# Patient Record
Sex: FEMALE | Race: WHITE | NOT HISPANIC OR LATINO | URBAN - METROPOLITAN AREA
[De-identification: names, ages, dates, MRNs, and addresses within clinical notes are randomized per-mention and may not be internally consistent; named-entity substitution may affect disease eponyms.]

---
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
## 2020-11-09 NOTE — ED ADULT NURSE NOTE - CAS EDN INTEG ASSESS
Received request via: Pharmacy    Was the patient seen in the last year in this department? Yes 10/8/20    Does the patient have an active prescription (recently filled or refills available) for medication(s) requested? No  
WDL

## 2022-05-02 ENCOUNTER — EMERGENCY (EMERGENCY)
Facility: HOSPITAL | Age: 22
LOS: 1 days | Discharge: ANOTHER HOSPITAL | End: 2022-05-02
Attending: EMERGENCY MEDICINE | Admitting: EMERGENCY MEDICINE
Payer: COMMERCIAL

## 2022-05-02 VITALS
TEMPERATURE: 98 F | RESPIRATION RATE: 18 BRPM | HEIGHT: 65 IN | OXYGEN SATURATION: 94 % | WEIGHT: 179.9 LBS | DIASTOLIC BLOOD PRESSURE: 83 MMHG | SYSTOLIC BLOOD PRESSURE: 129 MMHG | HEART RATE: 97 BPM

## 2022-05-02 DIAGNOSIS — Z90.89 ACQUIRED ABSENCE OF OTHER ORGANS: Chronic | ICD-10-CM

## 2022-05-02 DIAGNOSIS — F33.9 MAJOR DEPRESSIVE DISORDER, RECURRENT, UNSPECIFIED: ICD-10-CM

## 2022-05-02 PROBLEM — F90.9 ATTENTION-DEFICIT HYPERACTIVITY DISORDER, UNSPECIFIED TYPE: Chronic | Status: ACTIVE | Noted: 2019-08-01

## 2022-05-02 LAB
ALBUMIN SERPL ELPH-MCNC: 3.7 G/DL — SIGNIFICANT CHANGE UP (ref 3.3–5)
ALP SERPL-CCNC: 67 U/L — SIGNIFICANT CHANGE UP (ref 40–120)
ALT FLD-CCNC: 26 U/L — SIGNIFICANT CHANGE UP (ref 12–78)
AMPHET UR-MCNC: NEGATIVE — SIGNIFICANT CHANGE UP
ANION GAP SERPL CALC-SCNC: 5 MMOL/L — SIGNIFICANT CHANGE UP (ref 5–17)
APAP SERPL-MCNC: 3 UG/ML — LOW (ref 10–30)
APAP SERPL-MCNC: 3 UG/ML — LOW (ref 10–30)
AST SERPL-CCNC: 14 U/L — LOW (ref 15–37)
BARBITURATES UR SCN-MCNC: NEGATIVE — SIGNIFICANT CHANGE UP
BASOPHILS # BLD AUTO: 0.05 K/UL — SIGNIFICANT CHANGE UP (ref 0–0.2)
BASOPHILS NFR BLD AUTO: 0.6 % — SIGNIFICANT CHANGE UP (ref 0–2)
BENZODIAZ UR-MCNC: NEGATIVE — SIGNIFICANT CHANGE UP
BILIRUB SERPL-MCNC: 0.2 MG/DL — SIGNIFICANT CHANGE UP (ref 0.2–1.2)
BUN SERPL-MCNC: 13 MG/DL — SIGNIFICANT CHANGE UP (ref 7–23)
CALCIUM SERPL-MCNC: 9.1 MG/DL — SIGNIFICANT CHANGE UP (ref 8.5–10.1)
CHLORIDE SERPL-SCNC: 108 MMOL/L — SIGNIFICANT CHANGE UP (ref 96–108)
CO2 SERPL-SCNC: 26 MMOL/L — SIGNIFICANT CHANGE UP (ref 22–31)
COCAINE METAB.OTHER UR-MCNC: NEGATIVE — SIGNIFICANT CHANGE UP
CREAT SERPL-MCNC: 0.59 MG/DL — SIGNIFICANT CHANGE UP (ref 0.5–1.3)
EGFR: 131 ML/MIN/1.73M2 — SIGNIFICANT CHANGE UP
EOSINOPHIL # BLD AUTO: 0.24 K/UL — SIGNIFICANT CHANGE UP (ref 0–0.5)
EOSINOPHIL NFR BLD AUTO: 3 % — SIGNIFICANT CHANGE UP (ref 0–6)
GLUCOSE SERPL-MCNC: 109 MG/DL — HIGH (ref 70–99)
HCT VFR BLD CALC: 38.2 % — SIGNIFICANT CHANGE UP (ref 34.5–45)
HGB BLD-MCNC: 12.8 G/DL — SIGNIFICANT CHANGE UP (ref 11.5–15.5)
IMM GRANULOCYTES NFR BLD AUTO: 0.4 % — SIGNIFICANT CHANGE UP (ref 0–1.5)
LYMPHOCYTES # BLD AUTO: 1.6 K/UL — SIGNIFICANT CHANGE UP (ref 1–3.3)
LYMPHOCYTES # BLD AUTO: 20 % — SIGNIFICANT CHANGE UP (ref 13–44)
MCHC RBC-ENTMCNC: 31.4 PG — SIGNIFICANT CHANGE UP (ref 27–34)
MCHC RBC-ENTMCNC: 33.5 GM/DL — SIGNIFICANT CHANGE UP (ref 32–36)
MCV RBC AUTO: 93.6 FL — SIGNIFICANT CHANGE UP (ref 80–100)
METHADONE UR-MCNC: NEGATIVE — SIGNIFICANT CHANGE UP
MONOCYTES # BLD AUTO: 0.5 K/UL — SIGNIFICANT CHANGE UP (ref 0–0.9)
MONOCYTES NFR BLD AUTO: 6.3 % — SIGNIFICANT CHANGE UP (ref 2–14)
NEUTROPHILS # BLD AUTO: 5.58 K/UL — SIGNIFICANT CHANGE UP (ref 1.8–7.4)
NEUTROPHILS NFR BLD AUTO: 69.7 % — SIGNIFICANT CHANGE UP (ref 43–77)
NRBC # BLD: 0 /100 WBCS — SIGNIFICANT CHANGE UP (ref 0–0)
OPIATES UR-MCNC: POSITIVE — SIGNIFICANT CHANGE UP
PCP SPEC-MCNC: SIGNIFICANT CHANGE UP
PCP UR-MCNC: NEGATIVE — SIGNIFICANT CHANGE UP
PLATELET # BLD AUTO: 271 K/UL — SIGNIFICANT CHANGE UP (ref 150–400)
POTASSIUM SERPL-MCNC: 4.3 MMOL/L — SIGNIFICANT CHANGE UP (ref 3.5–5.3)
POTASSIUM SERPL-SCNC: 4.3 MMOL/L — SIGNIFICANT CHANGE UP (ref 3.5–5.3)
PROT SERPL-MCNC: 7.3 G/DL — SIGNIFICANT CHANGE UP (ref 6–8.3)
RBC # BLD: 4.08 M/UL — SIGNIFICANT CHANGE UP (ref 3.8–5.2)
RBC # FLD: 13 % — SIGNIFICANT CHANGE UP (ref 10.3–14.5)
SALICYLATES SERPL-MCNC: <1.7 MG/DL — LOW (ref 2.8–20)
SARS-COV-2 RNA SPEC QL NAA+PROBE: SIGNIFICANT CHANGE UP
SODIUM SERPL-SCNC: 139 MMOL/L — SIGNIFICANT CHANGE UP (ref 135–145)
THC UR QL: POSITIVE — SIGNIFICANT CHANGE UP
WBC # BLD: 8 K/UL — SIGNIFICANT CHANGE UP (ref 3.8–10.5)
WBC # FLD AUTO: 8 K/UL — SIGNIFICANT CHANGE UP (ref 3.8–10.5)

## 2022-05-02 PROCEDURE — 99285 EMERGENCY DEPT VISIT HI MDM: CPT

## 2022-05-02 PROCEDURE — 93010 ELECTROCARDIOGRAM REPORT: CPT

## 2022-05-02 PROCEDURE — 90792 PSYCH DIAG EVAL W/MED SRVCS: CPT | Mod: 95

## 2022-05-02 RX ORDER — IBUPROFEN 200 MG
400 TABLET ORAL ONCE
Refills: 0 | Status: COMPLETED | OUTPATIENT
Start: 2022-05-02 | End: 2022-05-02

## 2022-05-02 RX ADMIN — Medication 400 MILLIGRAM(S): at 21:41

## 2022-05-02 RX ADMIN — Medication 400 MILLIGRAM(S): at 20:41

## 2022-05-02 NOTE — ED PROVIDER NOTE - PHYSICAL EXAMINATION

## 2022-05-02 NOTE — ED PROVIDER NOTE - CLINICAL SUMMARY MEDICAL DECISION MAKING FREE TEXT BOX
here with intentional overdose, unclear how much she ingested. no toxidrome noted, will get >4 hour post ingestion Tylenol level and medically clear for psych evaluation.

## 2022-05-02 NOTE — ED BEHAVIORAL HEALTH ASSESSMENT NOTE - OTHER
pending bed availability self and parents not observed Records Checked-No Data: [facility], [facility], [facility], Patient Window, [facility] Outpatient Medical, [facility] Outpatient BH, [facility] ED, [facility] Inpatient, [facility] Outpatient, , [facility] In[facility], [facility] E&A, [facility], [facility], [facility], [facility], [facility], [facility], Social Media (For example - [employer], [employer], [id number]), Forensic Databases.

## 2022-05-02 NOTE — ED BEHAVIORAL HEALTH ASSESSMENT NOTE - VIOLENCE PROTECTIVE FACTORS:
Residential stability Residential stability/Engagement in treatment/Good treatment response/compliance

## 2022-05-02 NOTE — ED BEHAVIORAL HEALTH ASSESSMENT NOTE - DESCRIPTION
graduated [employer] and did some college for 1 semester. as per HPI PRE-HOSPITAL COURSE  SOURCE: Triage documentation  DETAILS: pt presenting as a walk-in accompanied by mother c/o N/V ,Suicidal ideation, Took unknown amount of liquid oxycodone, alcohol and smoked weed    ED COURSE  SOURCE: RN documentation  ARRIVAL: ambulatory / walk-in  BEHAVIOR: Pt is awake, calm, orientedx3, speaking coherently, cooperative with normal MSE. She provided the following hx: States attempted to take extra pain medication in order to hurt herself. States she wants to Die via overdose. States she has been feeling this way for several weeks. States good support system with friends.  States has sought outpt help and has psychiatry involvement.  TREATMENT: Ibuprofen 400 mg given at 20:41 for headache  VISITORS: parents; initially w/ mother then father

## 2022-05-02 NOTE — ED BEHAVIORAL HEALTH ASSESSMENT NOTE - NS ED B[facility] BILLING ATTENDING WO NP TRAINEE
How Severe Are Your Spot(S)?: mild Have Your Spot(S) Been Treated In The Past?: has not been treated Hpi Title: Evaluation of Skin Lesions [id number]

## 2022-05-02 NOTE — ED BEHAVIORAL HEALTH ASSESSMENT NOTE - NSBHROSSTATEMENT_PSY_A_CORE
Called patient to confirm her 9/16 appt with Dr Alfie Harman  Unable to leave message, received a busy tone  Patient has no completed EEG or Lab work  .

## 2022-05-02 NOTE — ED PROVIDER NOTE - NS ED ROS FT
Constitutional: No reported recent fever.  Neurological: No reported acute headache.  Eyes: No reported new vision changes.   Ears, Nose, Mouth, Throat: No reported acute sore throat.  Cardiovascular: No reported current chest pain.  Respiratory: No reported new shortness of breath.  Gastrointestinal: + nausea, vomiting.  Genitourinary: No reported new urinary problems.  Musculoskeletal: No reported acute extremity pain.  Integumentary (skin and/or breast): No reported new rash.

## 2022-05-02 NOTE — ED BEHAVIORAL HEALTH ASSESSMENT NOTE - DETAILS
mother will be contacted by this writer N/V and throat pain w/ Wellbutrin  mg pending bed availability resolved N/V Depression (maternal grandparents); DM and heart disease (father, paternal grandfather & maternal aunt); no family hx of suicides or substance use. resolved somnolence none prior to today headache unable to update parents at this time

## 2022-05-02 NOTE — ED BEHAVIORAL HEALTH ASSESSMENT NOTE - SUMMARY
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 Will), on Zoloft 50 mg, no past psychiatric admissions, suicide attempts or non-suicidal self injury, no history of violence, aggression or legal issues, reports regular use of cannabis and otherwise denies substance use, denies abuse/trauma and reports past medical history of allergic sinusitis and prior tonsillectomy (2/2019) who presents to the ED BIB parents after patient disclosed having ingested alcohol along with Oxycodone-Acetaminophen liquid and smoking cannabis with intent to hurt herself amidst feelings of wanting to die. In the ED, she conveyed feelings of depression for weeks in addition to thoughts of suicide. Her psychiatric evaluation is peculiar in that she does not readily relay an exacerbation of mood or anxiety symptoms precipitating her actions today but rather conveys having recently felt improved in her symptoms in the context of a reduction in situational stressors and initiation of Zoloft by her psychiatrist. She describes impulsive suicidal behavior that is entirely out of character for her and is unable to produce a rational explanation of her motive other than to simply harm herself. While she is no longer expressing active suicidality, the nature of her presentation remains concerning for ongoing risks to self as it is unclear if patient is minimizing her symptoms or presenting with treatment related suicidality as an adverse reaction to a new SSRI medication (particularly higher risk profile with adolescent and young adult patients). It would also be clinically risky to discharge patient on her medication if it is contributing to her safety risks and equally risk to discharge her without the medication if it is in fact alleviating the symptoms that triggered her suicidal behavior today. As such, she meets criteria for inpatient psychiatric admission for a multitude of reasons and is in agreement with the recommendation at this time. Should she become opposed to admission, she does currently meet involuntary criteria but may not if she is able to reliably engage in an account of her motivations in her actions. Nonetheless, her outpatient psychiatric provider would need to be contacted to safely coordinate her disposition if discharge is to be considered.

## 2022-05-02 NOTE — ED ADULT NURSE NOTE - OBJECTIVE STATEMENT
States attempted to take extra pain medication in order to hurt herself.  States she wants to Die via overdose.  States she has been feeling this way for several weeks.  States good support system with friends.  States has sought out pt help and has psychiatry involvement.

## 2022-05-02 NOTE — ED BEHAVIORAL HEALTH ASSESSMENT NOTE - HPI (INCLUDE ILLNESS QUALITY, SEVERITY, DURATION, TIMING, CONTEXT, MODIFYING FACTORS, ASSOCIATED SIGNS AND SYMPTOMS)
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 Will), on Zoloft 50 mg, no past psychiatric admissions, suicide attempts or non-suicidal self injury, no history of violence, aggression or legal issues, reports regular use of cannabis and otherwise denies substance use, denies abuse/trauma and reports past medical history of allergic sinusitis and prior tonsillectomy (2/2019) who presents to the ED BIB parents after patient disclosed having ingested ***. Psychiatry consulted for evaluation.     On assessment, patient relays "I was really depressed when I woke up then I drank medication with alcohol" sometime around 1:30pm. She describes her motives stating "I don't know if I wanted to die but I definitely wanted to hurt myself." She reports taking a couple ounces of some pain reliever from when she had her tonsils removed and drank some alcohol, approximately a shot of rum. She relays "then I got scared and I called my mom" elaborating that "I started not feeling good and I got nervous" and "I was really drowsy and nauseous." After telling her mother, her mother encouraged her to vomit which she did at home prior to leaving and again on ED arrival. She reports feeling better now but just has a bit of a headache and still feels a bit tired. She reports having struggled with depression for years and has been on Zoloft, after first trying a different medication, and the Zoloft seems to be working "I just have good and bad days." She notes having had more good days than bad "but then I night I can't stop thinking." She attributes this to stress from her job, working in retail as an , but notes he resigned a few days before Laura and has been looking for a different job. She tells me that in the interim, she found a psychiatrist, started some new medication, and has been trying to workout more in an effort to improve her mood. She notes that the Zoloft is actually a new medication in the last month. She relays frequently feeling fatigued or tired and always wants to sleep. She describes little to no motivation and anhedonia but does still make efforts to work out as often as she can. She relays robust appetite which she attributes to the Zoloft and her menses. She relays significant struggles with anxiety but feels it has been better controlled in recent months. She conveys intermittent death wishes in addition to SI that was worse around the time she quit her job. She maintains being "not sure if I was trying to kill myself" with her actions today. She admits she was trying to produce some sort of "damage" to herself. She denies HI, AVH, paranoia, or symptoms of tl. She denies any past suicide attempts or non-suicidal self injury. She reports prior nicotine vape use and denies frequent drinking of alcohol. She reports regular use of cannabis, "probably twice a week" usually with friends and otherwise denies any other substance use.     "I think I learned my lesson" elaborating that "this is stupid."     COVID Exposure Screen- Patient  Have you had a COVID-19 test in the last 90 days? Yes, negative at home test ~5 days ago  Have you tested positive for COVID-19 antibodies? Yes, reports having had COVID about 3-4 months ago  Have you received 2 doses of the COVID-19 vaccine? Yes, received 2nd vaccine dose around August  In the past 10 days, have you been around anyone with a positive COVID-19 test? No  Have you been out of [place] within the past 10 days? 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 Will), on Zoloft 50 mg, no past psychiatric admissions, suicide attempts or non-suicidal self injury, no history of violence, aggression or legal issues, reports regular use of cannabis and otherwise denies substance use, denies abuse/trauma and reports past medical history of allergic sinusitis and prior tonsillectomy (2/2019) who presents to the ED BIB parents after patient disclosed having ingested alcohol along with Oxycodone-Acetaminophen liquid and smoking cannabis with intent to hurt herself amidst feelings of wanting to die. In the ED, she conveyed feelings of depression for weeks in addition to thoughts of suicide. Psychiatry consulted for evaluation.     On assessment, patient relays "I was really depressed when I woke up then I drank medication with alcohol" sometime around 1:30pm. She describes her motives stating "I don't know if I wanted to die but I definitely wanted to hurt myself." She reports taking a couple ounces of some pain reliever from when she had her tonsils removed and drank some alcohol, approximately a shot of rum. She relays "then I got scared and I called my mom" elaborating that "I started not feeling good and I got nervous" and "I was really drowsy and nauseous." After telling her mother, her mother encouraged her to vomit which she did at home prior to leaving and again on ED arrival. She reports feeling better now but just has a bit of a headache and still feels a bit tired. She reports having struggled with depression for years and has recently been on Zoloft, after first trying a different medication, and the Zoloft seems to be working "I just have good and bad days." She notes having had more good days than bad recently "but then at night I can't stop thinking." She attributes this to stress from her job, previously working in retail as an , but notes she resigned a few days before Laura and has been looking for a different job. She tells me that in the interim, she found a psychiatrist, started the new medication, and has been trying to workout more in an effort to improve her mood and describes having felt she was actually doing better until this incident today occurred impulsively.    Patient notes that the Zoloft is actually a new medication in the last month after first having tried Wellbutrin with intolerable GI side effects. On ROS, she relays frequently feeling fatigued or tired and always wants to sleep. She describes little to no motivation and anhedonia but does still make efforts to work out as often as she can. She relays robust appetite which she attributes to the Zoloft and her upcoming menses. She relays significant struggles with anxiety but feels it has been better controlled in recent months noting that it was previously triggered mostly by her job. She conveys intermittent death wishes in addition to SI that was worse around the time she quit her job but admits she still intermittently has fleeting thoughts of death or suicide on her "bad days" such as today. She maintains being "not sure if I was trying to kill myself" with her actions today but instead admits she was trying to produce some sort of "damage" to herself. She is unable to convey what the purpose of producing damage would be if not death. She denies HI, AVH, paranoia, or symptoms of tl. She denies any past suicide attempts or non-suicidal self injury. She reports prior nicotine vape use, having quit use about a month ago and denies frequent drinking of alcohol. She reports regular use of cannabis, "probably twice a week" usually with friends and otherwise denies any other substance use. She denies any current thoughts of suicide or imminent intent to end her life stating "I think I learned my lesson" elaborating that "this is stupid."     COVID Exposure Screen- Patient  Have you had a COVID-19 test in the last 90 days? Yes, negative at home test ~5 days ago  Have you tested positive for COVID-19 antibodies? Yes, reports having had COVID about 3-4 months ago  Have you received 2 doses of the COVID-19 vaccine? Yes, received 2nd vaccine dose around August  In the past 10 days, have you been around anyone with a positive COVID-19 test? No  Have you been out of [place] within the past 10 days? No

## 2022-05-02 NOTE — ED BEHAVIORAL HEALTH ASSESSMENT NOTE - RISK ASSESSMENT
Moderate Acute Suicide Risk Pertinent known risk and protective factors are noted in documentation above

## 2022-05-02 NOTE — ED ADULT NURSE REASSESSMENT NOTE - NS ED NURSE REASSESS COMMENT FT1
Received patient from [facility] with 1:1 at the bedside. Received patient from [place] with 1:1 at the bedside and mother informed of the plan of care with understanding. Telepsych vidoe set up at the bedside.

## 2022-05-02 NOTE — ED PROVIDER NOTE - OBJECTIVE STATEMENT
21 F history depression here with mom after suicide attempt. patient with months of worsening depression, made plan to kill herself by drinking 21 F history depression here with mom after suicide attempt. patient with months of worsening depression, made plan to kill herself by drinking alcohol mixed with oxycodone 7.5/tylenol 325. patient had half a bottle left, maybe mixed half of that and drank some. (max 250 ml). patient anabella nausea, dizzy and scared so told her mom. mom made her vomit and she vomited several more times. No prior suicide attempts, takes Zoloft, psychiatrist Dr. Will. patient also smoking marijuana.

## 2022-05-03 ENCOUNTER — INPATIENT (INPATIENT)
Facility: HOSPITAL | Age: 22
LOS: 5 days | Discharge: HOME | End: 2022-05-09
Attending: PSYCHIATRY & NEUROLOGY | Admitting: PSYCHIATRY & NEUROLOGY

## 2022-05-03 VITALS
RESPIRATION RATE: 16 BRPM | TEMPERATURE: 98 F | SYSTOLIC BLOOD PRESSURE: 111 MMHG | HEART RATE: 74 BPM | DIASTOLIC BLOOD PRESSURE: 79 MMHG | OXYGEN SATURATION: 98 %

## 2022-05-03 VITALS — RESPIRATION RATE: 16 BRPM | HEIGHT: 65 IN | TEMPERATURE: 99 F | OXYGEN SATURATION: 97 % | WEIGHT: 179.9 LBS

## 2022-05-03 DIAGNOSIS — F33.9 MAJOR DEPRESSIVE DISORDER, RECURRENT, UNSPECIFIED: ICD-10-CM

## 2022-05-03 DIAGNOSIS — Z90.89 ACQUIRED ABSENCE OF OTHER ORGANS: Chronic | ICD-10-CM

## 2022-05-03 LAB — HCG UR QL: NEGATIVE — SIGNIFICANT CHANGE UP

## 2022-05-03 PROCEDURE — 93005 ELECTROCARDIOGRAM TRACING: CPT

## 2022-05-03 PROCEDURE — 80053 COMPREHEN METABOLIC PANEL: CPT

## 2022-05-03 PROCEDURE — 87635 SARS-COV-2 COVID-19 AMP PRB: CPT

## 2022-05-03 PROCEDURE — 99285 EMERGENCY DEPT VISIT HI MDM: CPT

## 2022-05-03 PROCEDURE — 99222 1ST HOSP IP/OBS MODERATE 55: CPT

## 2022-05-03 PROCEDURE — 36415 COLL VENOUS BLD VENIPUNCTURE: CPT

## 2022-05-03 PROCEDURE — 80307 DRUG TEST PRSMV CHEM ANLYZR: CPT

## 2022-05-03 PROCEDURE — 85025 COMPLETE CBC W/AUTO DIFF WBC: CPT

## 2022-05-03 PROCEDURE — 81025 URINE PREGNANCY TEST: CPT

## 2022-05-03 RX ORDER — METHYLPHENIDATE HCL 5 MG
1 TABLET ORAL
Qty: 0 | Refills: 0 | DISCHARGE

## 2022-05-03 RX ORDER — LANOLIN ALCOHOL/MO/W.PET/CERES
3 CREAM (GRAM) TOPICAL AT BEDTIME
Refills: 0 | Status: DISCONTINUED | OUTPATIENT
Start: 2022-05-03 | End: 2022-05-09

## 2022-05-03 RX ORDER — SERTRALINE 25 MG/1
50 TABLET, FILM COATED ORAL DAILY
Refills: 0 | Status: DISCONTINUED | OUTPATIENT
Start: 2022-05-04 | End: 2022-05-05

## 2022-05-03 RX ADMIN — Medication 3 MILLIGRAM(S): at 21:40

## 2022-05-03 RX ADMIN — Medication 1 TABLET(S): at 20:37

## 2022-05-03 NOTE — BH INPATIENT PSYCHIATRY ASSESSMENT NOTE - HPI (INCLUDE ILLNESS QUALITY, SEVERITY, DURATION, TIMING, CONTEXT, MODIFYING FACTORS, ASSOCIATED SIGNS AND SYMPTOMS)
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 Will), on Zoloft 50 mg, no past psychiatric admissions, suicide attempts or non-suicidal self injury, no history of violence, aggression or legal issues, reports regular use of cannabis and otherwise denies substance use, denies abuse/trauma and reports past medical history of allergic sinusitis and prior tonsillectomy (2/2019) who presents to the ED BIB parents after patient disclosed having ingested alcohol along with Oxycodone-Acetaminophen liquid and smoking cannabis with intent to hurt herself amidst feelings of wanting to die. In the ED, she conveyed feelings of depression for weeks in addition to thoughts of suicide. Patient was transferred to [facility] on 5/3.    On interview, patient is calm, cooperative, intermittently tearful. States she has been struggling with unemployment, with living at home, and with seeing her friends graduate college (patient left college after 1 semester). Patient states she was on [employer] the night before the suicide attempt and saw photos of people graduating. States this made her feel like a "loser." Patient woke up still feeling upset. Her girlfriend was in class and patient did not reach out for support. Patient states she had already seen the bottle of liquid percocet (rxed to her for her tonsil operation) which her mom had hidden in the mom's closet. Patient had noted it in case she would want to hurt herself in the future. On the day of the suicide attempt, patient waited until her father awoke so she could enter the room and get the bottle. She poured some of the medicine into a Angel's cup and got coke from downstairs to mix with it. She drank some but not all of this liquid and then had around a shot of rum from her room because she read not to mix the medication with alcohol. Patient states she thought it was not enough to kill herself, but thought that it would harm her. States that her intent was to harm herself, not kill herself. Patient states it was thinking of her parents and her girlfriend that stopped her from consuming more of the medicine. Patient states she stopped drinking the medicine when she realized she was getting tired, and texted her mother asking her to come up to the patient's room. She had not locked her door, said goodbyes, given away belongings, or imagined how she would be found. Her mother brought patient to the hospital. Reflecting now, patient states the attempt was "really stupid" and states she feels grateful to be alive.     Otherwise as per ED assessment:   "On assessment, patient relays "I was really depressed when I woke up then I drank medication with alcohol" sometime around 1:30pm. She describes her motives stating "I don't know if I wanted to die but I definitely wanted to hurt myself." She reports taking a couple ounces of some pain reliever from when she had her tonsils removed and drank some alcohol, approximately a shot of rum. She relays "then I got scared and I called my mom" elaborating that "I started not feeling good and I got nervous" and "I was really drowsy and nauseous." After telling her mother, her mother encouraged her to vomit which she did at home prior to leaving and again on ED arrival. She reports feeling better now but just has a bit of a headache and still feels a bit tired. She reports having struggled with depression for years and has recently been on Zoloft, after first trying a different medication, and the Zoloft seems to be working "I just have good and bad days." She notes having had more good days than bad recently "but then at night I can't stop thinking." She attributes this to stress from her job, previously working in retail as an , but notes she resigned a few days before [place] and has been looking for a different job. She tells me that in the interim, she found a psychiatrist, started the new medication, and has been trying to workout more in an effort to improve her mood and describes having felt she was actually doing better until this incident today occurred impulsively.    Patient notes that the Zoloft is actually a new medication in the last month after first having tried Wellbutrin with intolerable GI side effects. On ROS, she relays frequently feeling fatigued or tired and always wants to sleep. She describes little to no motivation and anhedonia but does still make efforts to work out as often as she can. She relays robust appetite which she attributes to the Zoloft and her upcoming menses. She relays significant struggles with anxiety but feels it has been better controlled in recent months noting that it was previously triggered mostly by her job. She conveys intermittent death wishes in addition to SI that was worse around the time she quit her job but admits she still intermittently has fleeting thoughts of death or suicide on her "bad days" such as today. She maintains being "not sure if I was trying to kill myself" with her actions today but instead admits she was trying to produce some sort of "damage" to herself. She is unable to convey what the purpose of producing damage would be if not death. She denies HI, AVH, paranoia, or symptoms of tl. She denies any past suicide attempts or non-suicidal self injury. She reports prior nicotine vape use, having quit use about a month ago and denies frequent drinking of alcohol. She reports regular use of cannabis, "probably twice a week" usually with friends and otherwise denies any other substance use. 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 Will), on Zoloft 50 mg, no past psychiatric admissions, suicide attempts or non-suicidal self injury, no history of violence, aggression or legal issues, reports regular use of cannabis and otherwise denies substance use, denies abuse/trauma and reports past medical history of allergic sinusitis and prior tonsillectomy (2/2019) who presents to the ED BIB parents after patient disclosed having ingested alcohol along with Oxycodone-Acetaminophen liquid and smoking cannabis with intent to hurt herself amidst feelings of wanting to die. In the ED, she conveyed feelings of depression for weeks in addition to thoughts of suicide. Patient was transferred to [facility] on 5/3.    On interview, patient is calm, cooperative, intermittently tearful. States she has been struggling with unemployment, with living at home, and with seeing her friends graduate college (patient left college after 1 semester). Patient states she was on [employer] the night before the suicide attempt and saw photos of people graduating. States this made her feel like a "loser." Patient woke up still feeling upset. Her girlfriend was in class and patient did not reach out for support. Patient states she had already seen the bottle of liquid percocet (rxed to her for her tonsil operation) which her mom had hidden in the mom's closet. Patient had noted it in case she would want to hurt herself in the future. On the day of the suicide attempt, patient waited until her father awoke so she could enter the room and get the bottle. She poured some of the medicine into a Angel's cup and got coke from downstairs to mix with it. She drank some but not all of this liquid and then had around a shot of rum from her room because she read not to mix the medication with alcohol. Patient states she thought it was not enough to kill herself, but thought that it would harm her. States that her intent was to harm herself, not kill herself. Patient states it was thinking of her parents and her girlfriend that stopped her from consuming more of the medicine. Patient states she stopped drinking the medicine when she realized she was getting tired, and texted her mother asking her to come up to the patient's room. She had not locked her door, said goodbyes, given away belongings, or imagined how she would be found. Her mother brought patient to the hospital. Reflecting now, patient states the attempt was "really stupid" and states she feels grateful to be alive. Patient denies symptoms of tl and psychosis.    Otherwise as per ED assessment:   "On assessment, patient relays "I was really depressed when I woke up then I drank medication with alcohol" sometime around 1:30pm. She describes her motives stating "I don't know if I wanted to die but I definitely wanted to hurt myself." She reports taking a couple ounces of some pain reliever from when she had her tonsils removed and drank some alcohol, approximately a shot of rum. She relays "then I got scared and I called my mom" elaborating that "I started not feeling good and I got nervous" and "I was really drowsy and nauseous." After telling her mother, her mother encouraged her to vomit which she did at home prior to leaving and again on ED arrival. She reports feeling better now but just has a bit of a headache and still feels a bit tired. She reports having struggled with depression for years and has recently been on Zoloft, after first trying a different medication, and the Zoloft seems to be working "I just have good and bad days." She notes having had more good days than bad recently "but then at night I can't stop thinking." She attributes this to stress from her job, previously working in retail as an , but notes she resigned a few days before [place] and has been looking for a different job. She tells me that in the interim, she found a psychiatrist, started the new medication, and has been trying to workout more in an effort to improve her mood and describes having felt she was actually doing better until this incident today occurred impulsively.    Patient notes that the Zoloft is actually a new medication in the last month after first having tried Wellbutrin with intolerable GI side effects. On ROS, she relays frequently feeling fatigued or tired and always wants to sleep. She describes little to no motivation and anhedonia but does still make efforts to work out as often as she can. She relays robust appetite which she attributes to the Zoloft and her upcoming menses. She relays significant struggles with anxiety but feels it has been better controlled in recent months noting that it was previously triggered mostly by her job. She conveys intermittent death wishes in addition to SI that was worse around the time she quit her job but admits she still intermittently has fleeting thoughts of death or suicide on her "bad days" such as today. She maintains being "not sure if I was trying to kill myself" with her actions today but instead admits she was trying to produce some sort of "damage" to herself. She is unable to convey what the purpose of producing damage would be if not death. She denies HI, AVH, paranoia, or symptoms of tl. She denies any past suicide attempts or non-suicidal self injury. She reports prior nicotine vape use, having quit use about a month ago and denies frequent drinking of alcohol. She reports regular use of cannabis, "probably twice a week" usually with friends and otherwise denies any other substance use. She denies any current thoughts of suicide or imminent intent to end her life stating "I think I learned my lesson" elaborating that "this is stupid.""

## 2022-05-03 NOTE — BH PATIENT PROFILE - STATED REASON FOR ADMISSION
"I got depressed and tried to kill myself." Patient denies current SI intent/thoughts/or plan and reports she will be able to come to staff if she feels the need.

## 2022-05-03 NOTE — BH INPATIENT PSYCHIATRY ASSESSMENT NOTE - OTHER PAST PSYCHIATRIC HISTORY (INCLUDE DETAILS REGARDING ONSET, COURSE OF ILLNESS, INPATIENT/OUTPATIENT TREATMENT)
Patient sees Dr. Will for med management.  Was on ADHD medications 7th grade - college but stopped once she left college  Was on Wellbutrin, partial response to low dose but side effects bad on 300 (nausea)  Patient states she did occasionally have suicidal thoughts while on Wellbutrin  Patient switched to Zoloft 1 month ago, increased to 50mg 2 weeks ago  States Zoloft is working better than Wellbutrin was for depression  History of passive SI (thoughts such as, "I want to die") but no history of active SI, plan or intent  No history of past suicide attempts  No history of NSSIB

## 2022-05-03 NOTE — BH INPATIENT PSYCHIATRY ASSESSMENT NOTE - DESCRIPTION
Patient lives at home with parents. She left college after 1 semester after not attending class (along with peers) and not earning a high enough GPA. Then went to [employer] and worked there, got promoted, for 3 years. Quit in December and has been unemployed since which has been challenging. Has trouble finding motivation to find work. Has a girlfriend in [place] and is thinking about spending summer with gf's family there. Pt's parents are not accepting of pt's girlfriend, who is pt's main source of support. Patient states she has difficulty in her relationships with both parents and does not seek emotional support from them.

## 2022-05-03 NOTE — BH PATIENT PROFILE - FALL HARM RISK - UNIVERSAL INTERVENTIONS
Bed in lowest position, wheels locked, appropriate side rails in place/Call bell, personal items and telephone in reach/Instruct patient to call for assistance before getting out of bed or chair/Non-slip footwear when patient is out of bed/[place] to call system/Physically safe environment - no spills, clutter or unnecessary equipment/Purposeful Proactive Rounding/Room/bathroom lighting operational, light cord in reach

## 2022-05-03 NOTE — BH INPATIENT PSYCHIATRY ASSESSMENT NOTE - MODIFICATIONS
Patient seen by me at length in conference room for initial inpatient interview with resident observing.  I was physically present during the service to the patient and personally examined the patient and I was directly involved in the management plan and recommendations of the care provided to the patient. I have reviewed the admission record and reviewed the patient’s physical examination, review of systems and there are no pertinent positive findings on the review of systems and the admission labs. I have discussed the case with the resident and I have reviewed the resident's progress note. I agree with the progress note’s contents and I have made changes as indicated.

## 2022-05-03 NOTE — ED ADULT NURSE REASSESSMENT NOTE - NS ED NURSE REASSESS COMMENT FT1
Patient had breakfast and tolerated well.  Father at bed side. Patient requesting to take Zoloft and amoxicillin.  Situation discussed with Dr. Blackmon. father gave the medications to patient as per Dr. Blackmon

## 2022-05-03 NOTE — ED ADULT NURSE REASSESSMENT NOTE - DESCRIPTION
Received the patient in the ER at the time of change of shift. Patient is alert and oriented, Resting comfortably in the bed. 1:1 observation is continuing. Pending for bed availability.

## 2022-05-03 NOTE — BH INPATIENT PSYCHIATRY ASSESSMENT NOTE - RISK ASSESSMENT
Acute risk factors include: hopelessness/helplessness, recent suicide attempt, impulsivity, active mood episode, active substance use, acute psychosocial stressors, poor reactivity to stressors, difficulty expressing emotions, social isolation, academic/occupational decline.  Chronic risk factors for suicide include: diagnosis of mood d/o.  Protective factors include: Young, healthy, denies SI/I/P, no history of NSSIB, identifies reasons for living, no prior psychiatric admissions, no active psychosis,  stable housing, engaged in treatment, medication/follow up compliance, help-seeking behaviors, no legal history.

## 2022-05-03 NOTE — BH INPATIENT PSYCHIATRY ASSESSMENT NOTE - DETAILS
Depression in mother but not formally dxed or txed   No history of suicide First meeting Nausea on 300mg buproprion See HPI

## 2022-05-03 NOTE — BH INPATIENT PSYCHIATRY ASSESSMENT NOTE - NSTREATMENTCERTY_PSY_ALL_CORE
This note was copied from the mother's chart.  Patient states infant is nursing well and she has no questions or concerns at this time. Patient states she would like to be discharged after infant is 24 hours old. Discharge information given.   That inpatient services furnished since the previous certification or recertification were, and continue to be required: for treatment that could reasonably be expected to improve the patient's condition; or, for diagnostic study;    That the hospital records show that the services furnished were: intensive treatment services; admission and related services necessary for diagnostic study or equivalent services;    That the patient continues to need, on a daily basis active inpatient treatment furnished directly by or requiring the supervision of inpatient psychiatric facility personnel.

## 2022-05-03 NOTE — ED BEHAVIORAL HEALTH NOTE - BEHAVIORAL HEALTH NOTE
========================  COLLATERAL  ========================  NAME: Nory Amaro   NUMBER: [phone]  RELATIONSHIP: Mother   RELIABILITY: Reliable source    Collateral (Nory Amaro, mother) has requested that the information provided remain confidential: Yes [ x ] No [  ]    Collateral (Nory Amaro, mother) has provided information that patient is/may be unaware of:      Yes [ x ] No [  ]  ========================  HPI:    Collateral obtained from mother who corroborates HPI provided to interdisciplinary team. She relays that at baseline patient is domiciled with her and father (is a retired , firearms have been safeguarded) in a private family home, is unemployed (working hx at [employer] [address], quit in 12/2021), completed one semester of college (not currently enrolled) and is a non-caregiver. She reports a PPHx of depression and anxiety with no hx of psychiatric hospitalizations. She notes that patient currently receives monthly psychopharm follow-up appointments with Dr. Luz Will ([phone], last seen ~3-4 weeks ago) who prescribes her Zoloft 50mg QD; patient is compliant with medication regimen. She relays that to her knowledge and aside from this most recent attempt, patient has hx of referencing SI; however has no hx of SA/SIB/HI. She denies a hx of manic or psychotic symptoms. She denies a hx of ETOH, drug or illicit drug use. She denies a hx of agitation, physical assault, obstruction to property or aggression. She notes that at baseline patients IADLs, appetite, sleeping pattern, energy levels and cognitive abilities/functioning are all WNL.     She notes that for the past few days,  patient has been a little more withdrawn, subdued and sleeping more. She is unable to identify a precipitating trigger, noting that this is significant depressive episode. She notes that yesterday patient sent her a text message admitting to ingesting liquid Oxycodone (left over from a tonsil surgery in 2019), a few Zoloft pills, alcohol and THC. She notes that patient became frightened as she started to feel lethargic and a numb sensation in her lips. She notes that she instructed patient to drink water and shortly thereafter brought her to the ED. She has concerns for self-harm and believes that patient would benefit from additional psychiatric treatment.     Scarlet made an attempt to contact Dr. Will at the number provided with no success; a voicemail was left requesting a phone call back..

## 2022-05-03 NOTE — BH INPATIENT PSYCHIATRY ASSESSMENT NOTE - NSCOMMENTSUICRISKFACT_PSY_ALL_CORE
Patient reports being unable to find a therapist, and feeling distant from friends who are graduating college

## 2022-05-03 NOTE — BH PATIENT PROFILE - HOME MEDICATIONS
methylphenidate 30 mg/24 hours oral capsule, extended release , 1 cap(s) orally once a day (in the morning)

## 2022-05-03 NOTE — BH INPATIENT PSYCHIATRY ASSESSMENT NOTE - CASE SUMMARY
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 Will), on Zoloft 50 mg, no past psychiatric admissions, suicide attempts or non-suicidal self injury, no history of violence, aggression or legal issues, reports regular use of cannabis and otherwise denies substance use, denies abuse/trauma and reports past medical history of allergic sinusitis and prior tonsillectomy (2/2019) who presents to the ED BIB parents after patient disclosed having ingested alcohol along with Oxycodone-Acetaminophen liquid and smoking cannabis with intent to hurt herself amidst feelings of wanting to die      Patient with a few year history of mood disorder consistent with MDD. Has been increasingly depressed over not achieving like her friends who are now graduating college and quit her job 5 months ago in December and partner is out of state  Patient reports seeing her old pain meds in mother’s closet a few days ago. Was in an upsetting chat room and felt helpless and hopeless and with brief SI. Looked up on the internet the lethal dose of the Percocet but took less and texted her mother about what she did. Mom had her throw up and go to ER at [facility] and was transferred su[facility] for inpatient treatment and care    On initial MSE today the patient is casually dressed and makes fair eye contact.  Speech is clear and of normal rate.   Patient’s thought process with no evidence of a disorder of thought process.  Patient’s thought content with no evidence of delusions or obsessions.   Patient denies hallucinations.   Mood "depressed" affect constricted in the depressed range.    Patient admits to passive thoughts of death and her attempt but denies active suicidal ideation, intent and plan.   Patient denies aggressive/homicidal ideation, intent or plan.   Patient is AAO X3. Patient is cognitively grossly intact.   Patient’s insight and judgment are limited.   Patient’s impulse control is intact at this time.        Dx most consistent with MDD  status post suicide attempt with intention  PLAN:  Patient requires acute inpatient care for treatment of depression.  Patient admitted on a 913 voluntary status   Patient does not require constant observation at this time and will follow with routine checks.   Patient’s best past response was on her present dose of sertraline which was increased two weeks ago to her present dose of 50 mg. Prior history of partial response to bupropion but had difficulty tolerating dose at 300 mg    Will obtain collateral from family and psychiatrist and adjust  plan accordingly    Treatment will include individual therapy/supportive therapy/ rehab therapy/ psychopharmacological therapy and milieu therapy

## 2022-05-03 NOTE — BH INPATIENT PSYCHIATRY ASSESSMENT NOTE - CURRENT MEDICATION
MEDICATIONS  (STANDING):    MEDICATIONS  (PRN):   MEDICATIONS  (STANDING):    MEDICATIONS  (PRN):  LORazepam     Tablet 1 milliGRAM(s) Oral every 4 hours PRN anxiety  LORazepam   Injectable 2 milliGRAM(s) IntraMuscular once PRN severe agitation  melatonin. 3 milliGRAM(s) Oral at bedtime PRN Insomnia   MEDICATIONS  (STANDING):  amoxicillin  875 milliGRAM(s)/clavulanate 1 Tablet(s) Oral two times a day    MEDICATIONS  (PRN):  LORazepam     Tablet 1 milliGRAM(s) Oral every 4 hours PRN anxiety  LORazepam   Injectable 2 milliGRAM(s) IntraMuscular once PRN severe agitation  melatonin. 3 milliGRAM(s) Oral at bedtime PRN Insomnia

## 2022-05-03 NOTE — CHART NOTE - NSCHARTNOTEFT_GEN_A_CORE
Screening Medical Evaluation    Patient Admitted from: [facility] ED    [facility] admitting diagnosis: Recurrent major depressive disorder      PAST MEDICAL & SURGICAL HISTORY:  ADHD  History of tonsillectomy    ALLERGIES:  No Known Allergies    SOCIAL HISTORY:  Patient reports hx of vaping nicotine, last use 2 months ago, and occasional cannabis use. Patient denies alcohol or other substance use.     FAMILY HISTORY:  No known pertinent family history in 1st degree relatives.      MEDICATIONS  (STANDING):  amoxicillin  875 milliGRAM(s)/clavulanate 1 Tablet(s) Oral two times a day    MEDICATIONS  (PRN):  LORazepam     Tablet 1 milliGRAM(s) Oral every 4 hours PRN anxiety  LORazepam   Injectable 2 milliGRAM(s) IntraMuscular once PRN severe agitation  melatonin. 3 milliGRAM(s) Oral at bedtime PRN Insomnia      Vital Signs Last 24 Hrs  T(C): 37 (03 May 2022 15:52), Max: 37 (03 May 2022 07:13)  T(F): 98.6 (03 May 2022 15:52), Max: 98.6 (03 May 2022 07:13)  HR: 74 (03 May 2022 14:45) (68 - 82)  BP: 111/79 (03 May 2022 14:45) (104/70 - 131/89)  BP(mean): --  RR: 16 (03 May 2022 15:52) (16 - 17)  SpO2: 97% (03 May 2022 15:52) (97% - 98%)      PHYSICAL EXAM:  GENERAL: NAD, well-developed  HEAD:  Atraumatic, Normocephalic  EYES: EOMI, PERRLA, conjunctiva and sclera clear  NECK: Supple, No JVD  CHEST/LUNG: Clear to auscultation bilaterally; No wheeze  HEART: Regular rate and rhythm; No murmurs, rubs, or gallops  ABDOMEN: Soft, Nontender, Nondistended; Bowel sounds present  EXTREMITIES:  2+ Peripheral Pulses, No clubbing, cyanosis, or edema  PSYCH: AAOx3  NEUROLOGY: non-focal  SKIN: No rashes or lesions      LABS:                        12.8   8.00  )-----------( 271      ( 02 May 2022 15:38 )             38.2     05-02    139  |  108  |  13  ----------------------------<  109<H>  4.3   |  26  |  0.59    Ca    9.1      02 May 2022 15:38    TPro  7.3  /  Alb  3.7  /  TBili  0.2  /  DBili  x   /  AST  14<L>  /  ALT  26  /  AlkPhos  67  05-02      Assessment and Plan:  21F admitted to [facility] for recurrent major depressive disorder w/ PMHx of ADHD seen at bedside for medical screening evaluation. Patient currently on a 2 week course of Augmentin for sinusitis ending on 5/6, currently asymptomatic. Patient has no other acute complaints at this time. Patient denies fever, chills, headache, dizziness, lightheadedness, N/V, SOB, cough, congestion, chest pain, abdominal pain, dysuria, hematuria, diarrhea, constipation. Physical exam unremarkable, VSS. Labs WNL.     1.) Recurrent major depressive disorder: Refer to primary team documentation for recs.  2.) Sinusitis: Continue amoxicillin 875 milliGRAM(s)/clavulanate BID x 3 days (Course ends 5/6)

## 2022-05-03 NOTE — BH INPATIENT PSYCHIATRY ASSESSMENT NOTE - NSBHCHARTREVIEWVS_PSY_A_CORE FT
Vital Signs Last 24 Hrs  T(C): 37 (05-03-22 @ 15:52), Max: 37 (05-03-22 @ 07:13)  T(F): 98.6 (05-03-22 @ 15:52), Max: 98.6 (05-03-22 @ 07:13)  HR: 74 (05-03-22 @ 14:45) (68 - 82)  BP: 111/79 (05-03-22 @ 14:45) (104/70 - 131/89)  BP(mean): --  RR: 16 (05-03-22 @ 15:52) (16 - 17)  SpO2: 97% (05-03-22 @ 15:52) (97% - 98%)    Orthostatic VS  05-03-22 @ 15:52  Lying BP: --/-- HR: --  Sitting BP: 135/85 HR: 113  Standing BP: 115/71 HR: 102  Site: --  Mode: --

## 2022-05-03 NOTE — BH INPATIENT PSYCHIATRY ASSESSMENT NOTE - MSE UNSTRUCTURED FT
Mental Status Exam:  On exam today the patient is calm and cooperative with good eye contact.    Speech is clear and of normal rate.    Thought process: linear and goal directed.    Thought content: with no evidence of false beliefs or obsessions.  Perception: denies perceptual disturbances  Mood: Describes as "depressed"  Affect: Reactive, congruent, depressed, appropriate   SI/HI: Patient denies suicidal ideation, intent or plan. Patient denies active aggressive/homicidal ideation, intent or plan.   Cognition: Patient is Alert and oriented. Cognition grossly intact  Fund of knowledge: Fair  Memory: Recent and remote intact  Insight and judgment: Fair.   Impulse control: Intact at this time.    Vital signs: Stable.

## 2022-05-03 NOTE — BH INPATIENT PSYCHIATRY ASSESSMENT NOTE - NSBHASSESSSUMMFT_PSY_ALL_CORE
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 Will), on Zoloft 50 mg, no past psychiatric admissions, suicide attempts or non-suicidal self injury, no history of violence, aggression or legal issues, reports regular use of cannabis and otherwise denies substance use, denies abuse/trauma and reports past medical history of allergic sinusitis and prior tonsillectomy (2/2019) who presents to the ED BIB parents after patient disclosed having ingested alcohol along with Oxycodone-Acetaminophen liquid and smoking cannabis with intent to hurt herself amidst feelings of wanting to die. In the ED, she conveyed feelings of depression for weeks in addition to thoughts of suicide. On interview, she describes increasing psychosocial stressors leading to feelings of worthlessness and hopelessness, which prompted suicide attempt. Attempt appears to have been premeditated, but with low perceived and objective lethality and high rescuability. Patient feels the attempt was a bad idea and is grateful to be alive. Given pt's history of passive SI while on Wellbutrin, low c/f for SSRI-related suicidality. Differential diagnosis includes MDD, adjustment disorder, and substance induced mood disorder.    Plan:  C/w sertraline 50mg  Called pt's pharmacy for med rec. Patient taking Augmentin 875mg/125mg BID for sinusitis since 4/22 for total 2week course. Ordered through May 6.  Obtain collateral from psychiatrist and parents  PRN Ativan PO/IM for anxiety and agitation  Patient requires acute inpatient care for treatment of suicidality  Patient transferred from [facility] ER and admitted on a 9.13 voluntary status   Patient does not require constant observation at this time and will follow with routine checks.   Treatment will include individual therapy/supportive therapy/ rehab therapy/ psychopharmacological therapy and milieu therapy

## 2022-05-04 LAB
ALBUMIN SERPL ELPH-MCNC: 4.6 G/DL — SIGNIFICANT CHANGE UP (ref 3.3–5)
ALP SERPL-CCNC: 79 U/L — SIGNIFICANT CHANGE UP (ref 40–120)
ALT FLD-CCNC: 21 U/L — SIGNIFICANT CHANGE UP (ref 4–33)
ANION GAP SERPL CALC-SCNC: 11 MMOL/L — SIGNIFICANT CHANGE UP (ref 7–14)
AST SERPL-CCNC: 18 U/L — SIGNIFICANT CHANGE UP (ref 4–32)
BASOPHILS # BLD AUTO: 0.03 K/UL — SIGNIFICANT CHANGE UP (ref 0–0.2)
BASOPHILS NFR BLD AUTO: 0.3 % — SIGNIFICANT CHANGE UP (ref 0–2)
BILIRUB SERPL-MCNC: 0.6 MG/DL — SIGNIFICANT CHANGE UP (ref 0.2–1.2)
BUN SERPL-MCNC: 10 MG/DL — SIGNIFICANT CHANGE UP (ref 7–23)
CALCIUM SERPL-MCNC: 9.5 MG/DL — SIGNIFICANT CHANGE UP (ref 8.4–10.5)
CHLORIDE SERPL-SCNC: 103 MMOL/L — SIGNIFICANT CHANGE UP (ref 98–107)
CHOLEST SERPL-MCNC: 164 MG/DL — SIGNIFICANT CHANGE UP
CO2 SERPL-SCNC: 24 MMOL/L — SIGNIFICANT CHANGE UP (ref 22–31)
CREAT SERPL-MCNC: 0.59 MG/DL — SIGNIFICANT CHANGE UP (ref 0.5–1.3)
EGFR: 131 ML/MIN/1.73M2 — SIGNIFICANT CHANGE UP
EOSINOPHIL # BLD AUTO: 0.15 K/UL — SIGNIFICANT CHANGE UP (ref 0–0.5)
EOSINOPHIL NFR BLD AUTO: 1.6 % — SIGNIFICANT CHANGE UP (ref 0–6)
GLUCOSE SERPL-MCNC: 131 MG/DL — HIGH (ref 70–99)
HCT VFR BLD CALC: 39.7 % — SIGNIFICANT CHANGE UP (ref 34.5–45)
HDLC SERPL-MCNC: 33 MG/DL — LOW
HGB BLD-MCNC: 13.3 G/DL — SIGNIFICANT CHANGE UP (ref 11.5–15.5)
IANC: 6.45 K/UL — SIGNIFICANT CHANGE UP (ref 1.8–7.4)
IMM GRANULOCYTES NFR BLD AUTO: 0.3 % — SIGNIFICANT CHANGE UP (ref 0–1.5)
LIPID PNL WITH DIRECT LDL SERPL: 107 MG/DL — HIGH
LYMPHOCYTES # BLD AUTO: 2.05 K/UL — SIGNIFICANT CHANGE UP (ref 1–3.3)
LYMPHOCYTES # BLD AUTO: 22.1 % — SIGNIFICANT CHANGE UP (ref 13–44)
MCHC RBC-ENTMCNC: 31.5 PG — SIGNIFICANT CHANGE UP (ref 27–34)
MCHC RBC-ENTMCNC: 33.5 GM/DL — SIGNIFICANT CHANGE UP (ref 32–36)
MCV RBC AUTO: 94.1 FL — SIGNIFICANT CHANGE UP (ref 80–100)
MONOCYTES # BLD AUTO: 0.56 K/UL — SIGNIFICANT CHANGE UP (ref 0–0.9)
MONOCYTES NFR BLD AUTO: 6 % — SIGNIFICANT CHANGE UP (ref 2–14)
NEUTROPHILS # BLD AUTO: 6.45 K/UL — SIGNIFICANT CHANGE UP (ref 1.8–7.4)
NEUTROPHILS NFR BLD AUTO: 69.7 % — SIGNIFICANT CHANGE UP (ref 43–77)
NON HDL CHOLESTEROL: 131 MG/DL — HIGH
NRBC # BLD: 0 /100 WBCS — SIGNIFICANT CHANGE UP
NRBC # FLD: 0 K/UL — SIGNIFICANT CHANGE UP
PLATELET # BLD AUTO: 316 K/UL — SIGNIFICANT CHANGE UP (ref 150–400)
POTASSIUM SERPL-MCNC: 4.4 MMOL/L — SIGNIFICANT CHANGE UP (ref 3.5–5.3)
POTASSIUM SERPL-SCNC: 4.4 MMOL/L — SIGNIFICANT CHANGE UP (ref 3.5–5.3)
PROT SERPL-MCNC: 7.4 G/DL — SIGNIFICANT CHANGE UP (ref 6–8.3)
RBC # BLD: 4.22 M/UL — SIGNIFICANT CHANGE UP (ref 3.8–5.2)
RBC # FLD: 13.1 % — SIGNIFICANT CHANGE UP (ref 10.3–14.5)
SODIUM SERPL-SCNC: 138 MMOL/L — SIGNIFICANT CHANGE UP (ref 135–145)
TRIGL SERPL-MCNC: 119 MG/DL — SIGNIFICANT CHANGE UP
TSH SERPL-MCNC: 3.73 UIU/ML — SIGNIFICANT CHANGE UP (ref 0.27–4.2)
WBC # BLD: 9.27 K/UL — SIGNIFICANT CHANGE UP (ref 3.8–10.5)
WBC # FLD AUTO: 9.27 K/UL — SIGNIFICANT CHANGE UP (ref 3.8–10.5)

## 2022-05-04 PROCEDURE — 99232 SBSQ HOSP IP/OBS MODERATE 35: CPT

## 2022-05-04 RX ADMIN — SERTRALINE 50 MILLIGRAM(S): 25 TABLET, FILM COATED ORAL at 08:26

## 2022-05-04 RX ADMIN — Medication 1 TABLET(S): at 20:57

## 2022-05-04 RX ADMIN — Medication 3 MILLIGRAM(S): at 22:18

## 2022-05-04 RX ADMIN — Medication 1 TABLET(S): at 08:26

## 2022-05-04 NOTE — BH SOCIAL WORK INITIAL PSYCHOSOCIAL EVALUATION - NSPTSTATEDGOAL_PSY_ALL_CORE
pt wants to find another job, seek psychotherapy treatment as well as return to outpatient psychiatrist for pharmacological treatment.

## 2022-05-04 NOTE — BH TREATMENT PLAN - NSTXCAREGIVERPARTICIPATE_PSY_P_CORE
Family/Caregiver participated in identification of needs/problems/goals for treatment Family/Caregiver participated in identification of needs/problems/goals for treatment/Family/Caregiver participated in defining interventions/Family/Caregiver participated in development of after care plan

## 2022-05-04 NOTE — DIETITIAN INITIAL EVALUATION ADULT - OTHER INFO
Pt admitted to [facility] for depression and suicidal thoughts. Reports good appetite/po intake at present. No GI distress noted.

## 2022-05-04 NOTE — BH INPATIENT PSYCHIATRY PROGRESS NOTE - MODIFICATIONS
Patient interviewed and evaluated with resident present to follow up on depressive symptoms and the case has been reviewed with the treatment team this morning.   I was physically present during the service to the patient and personally examined the patient and was directly involved in the management and recommendations of the care provided to the patient.  I have reviewed the resident's progress note and the mental status examination and I agree with its contents and made changes as indicated

## 2022-05-04 NOTE — PSYCHIATRIC REHAB INITIAL EVALUATION - NSBHEMPSTRENGTHS2FT_PSY_ALL_CORE
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 Pt hopes to find employment soon.

## 2022-05-04 NOTE — BH TREATMENT PLAN - NSTXPATIENTPARTICIPATE_PSY_ALL_CORE
Patient participated in identification of needs/problems/goals for treatment Patient participated in identification of needs/problems/goals for treatment/Patient participated in defining interventions/Patient participated in development of after care plan

## 2022-05-04 NOTE — BH TREATMENT PLAN - NSTXDCOPLKINTERSW_PSY_ALL_CORE
SW reviewed EMR. SW met with patient for support and to obtain information regarding pts admission. SW met with team to discuss pts tx options. SW obtained consent to speak with pts parents, Nory and Estiven.

## 2022-05-04 NOTE — PSYCHIATRIC REHAB INITIAL EVALUATION - NSBHPRRECOMMEND_PSY_ALL_CORE
Writer met with patient to orient to unit and introduce self, psychiatric rehabilitation staff and department functions. Patient was provided a copy of the unit schedule. On interview, patient was polite and cooperative. Patient presented with appropriate and eye contact. Patient was forthcoming and willing to participate in initial interview. Patient demonstrated fair insight and judgement into her symptoms and treatment at this time. Writer encouraged patient to attend psychiatric rehabilitation groups and engage in treatment. Writer and patient were able to establish a collaborative psychiatric rehabilitation goal. Psychiatric Rehabilitation staff will continue to engage patient daily in order to develop therapeutic rapport.

## 2022-05-04 NOTE — BH INPATIENT PSYCHIATRY PROGRESS NOTE - CASE SUMMARY
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 Will), on Zoloft 50 mg, no past psychiatric admissions, suicide attempts or non-suicidal self injury, no history of violence, aggression or legal issues, reports regular use of cannabis and otherwise denies substance use, denies abuse/trauma and reports past medical history of allergic sinusitis and prior tonsillectomy (2/2019) who presents to the ED BIB parents after patient disclosed having ingested alcohol along with Oxycodone-Acetaminophen liquid and smoking cannabis with intent to hurt herself amidst feelings of wanting to die      5/4 clinical update  adjusting well to the unit  discussed treatment plans      PLAN:  Patient requires acute inpatient care for treatment of depression.  Patient admitted on a 913 voluntary status   Patient does not require constant observation at this time and will follow with routine checks.   Patient’s best past response was on her present dose of sertraline which was increased two weeks ago to her present dose of 50 mg.  Will likely plan to increase sertraline to 75 mg    Will obtain collateral from family and psychiatrist and adjust  plan accordingly    Treatment will include individual therapy/supportive therapy/ rehab therapy/ psychopharmacological therapy and milieu therapy

## 2022-05-04 NOTE — DIETITIAN INITIAL EVALUATION ADULT - ORAL INTAKE PTA/DIET HISTORY
Pt reports binge eating for ~ 4 months. She has been home after resigned job. Pt states "taking Zoloft and been home makes me hungry"

## 2022-05-04 NOTE — BH INPATIENT PSYCHIATRY PROGRESS NOTE - NSBHCHARTREVIEWVS_PSY_A_CORE FT
Vital Signs Last 24 Hrs  T(C): 36.9 (05-04-22 @ 08:30), Max: 37 (05-03-22 @ 15:52)  T(F): 98.4 (05-04-22 @ 08:30), Max: 98.6 (05-03-22 @ 15:52)  HR: 74 (05-03-22 @ 14:45) (70 - 74)  BP: 111/79 (05-03-22 @ 14:45) (111/79 - 131/89)  BP(mean): --  RR: 16 (05-03-22 @ 15:52) (16 - 16)  SpO2: 97% (05-03-22 @ 15:52) (97% - 98%)    Orthostatic VS  05-04-22 @ 08:30  Lying BP: --/-- HR: --  Sitting BP: 118/67 HR: 88  Standing BP: 108/72 HR: 108  Site: --  Mode: --  Orthostatic VS  05-03-22 @ 15:52  Lying BP: --/-- HR: --  Sitting BP: 135/85 HR: 113  Standing BP: 115/71 HR: 102  Site: --  Mode: --   Vital Signs Last 24 Hrs  T(C): 36.9 (05-04-22 @ 08:30), Max: 37 (05-03-22 @ 15:52)  T(F): 98.4 (05-04-22 @ 08:30), Max: 98.6 (05-03-22 @ 15:52)  HR: 74 (05-03-22 @ 14:45) (74 - 74)  BP: 111/79 (05-03-22 @ 14:45) (111/79 - 111/79)  BP(mean): --  RR: 16 (05-03-22 @ 15:52) (16 - 16)  SpO2: 97% (05-03-22 @ 15:52) (97% - 98%)    Orthostatic VS  05-04-22 @ 08:30  Lying BP: --/-- HR: --  Sitting BP: 118/67 HR: 88  Standing BP: 108/72 HR: 108  Site: --  Mode: --  Orthostatic VS  05-03-22 @ 15:52  Lying BP: --/-- HR: --  Sitting BP: 135/85 HR: 113  Standing BP: 115/71 HR: 102  Site: --  Mode: --

## 2022-05-04 NOTE — BH SOCIAL WORK INITIAL PSYCHOSOCIAL EVALUATION - NSBHEDULEVEL_PSY_ALL_CORE
Dropped out of her first semester at [employer] [place] (bio-med degree) due to GPA decrease/Associate's Degree Dropped out of her first semester at [employer] (bio-med degree) due to GPA decrease/High (Secondary) School

## 2022-05-04 NOTE — BH INPATIENT PSYCHIATRY PROGRESS NOTE - NSBHFUPINTERVALHXFT_PSY_A_CORE
Overnight, no acute events. Patient did not require PRN medications for agitation or anxiety. This morning, patient states she slept well and was able to wake up earlier than usual. States she is feeling more motivated to tend to ADLs, shower, get out of bed. States that she feels motivated because she knows she is in the hospital for a good reason and wants to make most out of her time here. Patient states that her motivation and focus were not markedly better on buproprion as compared to sertraline.  Overnight, no acute events. Patient did not require PRN medications for agitation or anxiety. This morning, patient states she slept well and was able to wake up earlier than usual. States she is feeling more motivated to tend to ADLs, shower, get out of bed. States that she feels motivated because she knows she is in the hospital for a good reason and wants to make most out of her time here. Patient states that her motivation and focus were not markedly better on buproprion as compared to sertraline.     Discussed with pt's mother, Nory. Mother states that she had thought that patient was doing better and was surprised that patient was in so much pain that she did not want to live. When asked to clarify if patient said she wanted to die, mother states that patient said she "couldn't take it anymore." Mother states that patient has always struggled with school, had ADHD, had issues with studying and motivation. Did not earn any credits in college. States patient has issues with severe anxiety at job leading to her not being able to drive to work, leading to patient quitting the job. Mother states patient has had issues selecting an appropriate job and fully applying herself to the application process. Mother notes patient had traumatic experience in elementary school when her father had extramarital affair with the parent of one of her friends, leading to ongoing difficulties in their relationship.

## 2022-05-04 NOTE — BH SOCIAL WORK INITIAL PSYCHOSOCIAL EVALUATION - OTHER PAST PSYCHIATRIC HISTORY (INCLUDE DETAILS REGARDING ONSET, COURSE OF ILLNESS, INPATIENT/OUTPATIENT TREATMENT)
History of MDD,  ANGIE and ADHD. Pt has been seeing Dr. Will  for medication management. Recently prescribed Zoloft. Pt was on ADHD medication from grades 7 through college.

## 2022-05-04 NOTE — BH SOCIAL WORK INITIAL PSYCHOSOCIAL EVALUATION - NSHIGHRISKBEHFT_PSY_ALL_CORE
Pt attempted to self harm by ingesting Oxycodone-Acetominophen  with alcohol.  Pt ingested Oxycodone-Acetominophen  with alcohol.

## 2022-05-04 NOTE — DIETITIAN INITIAL EVALUATION ADULT - PERSON TAUGHT/METHOD
provided verbal and written education re: Binge eating ,healthy eating and weight management. Pt verbalized understanding./patient instructed

## 2022-05-04 NOTE — DIETITIAN INITIAL EVALUATION ADULT - PERTINENT MEDS FT
MEDICATIONS  (STANDING):  amoxicillin  875 milliGRAM(s)/clavulanate 1 Tablet(s) Oral two times a day  sertraline 50 milliGRAM(s) Oral daily

## 2022-05-04 NOTE — BH SOCIAL WORK INITIAL PSYCHOSOCIAL EVALUATION - NSBHFINANCE_PSY_ALL_CORE
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

## 2022-05-04 NOTE — BH INPATIENT PSYCHIATRY PROGRESS NOTE - MSE UNSTRUCTURED FT
Mental Status Exam:  On exam today the patient is calm and cooperative with good eye contact. Well related  Speech is clear and of normal rate.    Thought process: linear and goal directed.    Thought content: with no evidence of false beliefs or obsessions.  Perception: denies perceptual disturbances  Mood: Describes as "pretty good"  Affect: Reactive, congruent, dysthymic, appropriate   SI/HI: Patient denies suicidal ideation, intent or plan. Patient denies active aggressive/homicidal ideation, intent or plan.   Cognition: Patient is Alert and oriented. Cognition grossly intact  Fund of knowledge: Fair  Memory: Recent and remote intact  Insight and judgment: Fair.   Impulse control: Intact at this time.    Vital signs: Stable. Mental Status Exam:  On exam today the patient is calm and cooperative with good eye contact. Well-related  Speech is clear and of normal rate.    Thought process: linear and goal directed.    Thought content: with no evidence of false beliefs or obsessions.  Perception: denies perceptual disturbances  Mood: Describes as "pretty good"  Affect: Reactive, congruent, dysthymic, appropriate   SI/HI: Patient denies suicidal ideation, intent or plan. Patient denies active aggressive/homicidal ideation, intent or plan.   Cognition: Patient is Alert and oriented. Cognition grossly intact  Fund of knowledge: Fair  Memory: Recent and remote intact  Insight and judgment: Fair.   Impulse control: Intact at this time.    Vital signs: Stable.

## 2022-05-05 PROCEDURE — 99232 SBSQ HOSP IP/OBS MODERATE 35: CPT

## 2022-05-05 RX ORDER — SERTRALINE 25 MG/1
75 TABLET, FILM COATED ORAL DAILY
Refills: 0 | Status: DISCONTINUED | OUTPATIENT
Start: 2022-05-05 | End: 2022-05-06

## 2022-05-05 RX ADMIN — SERTRALINE 75 MILLIGRAM(S): 25 TABLET, FILM COATED ORAL at 08:28

## 2022-05-05 RX ADMIN — Medication 1 TABLET(S): at 08:28

## 2022-05-05 RX ADMIN — Medication 1 TABLET(S): at 20:41

## 2022-05-05 RX ADMIN — Medication 3 MILLIGRAM(S): at 23:10

## 2022-05-05 NOTE — BH INPATIENT PSYCHIATRY PROGRESS NOTE - NSBHFUPINTERVALHXFT_PSY_A_CORE
Patient seen for follow up of depression  Chart reviewed and case discussed with treatment team  Patient remains depressed but feels safe on unit and denying any hopelessness and SI  Discussed increase in sertraline dose to 75 mg

## 2022-05-05 NOTE — BH INPATIENT PSYCHIATRY PROGRESS NOTE - NSBHCHARTREVIEWVS_PSY_A_CORE FT
Vital Signs Last 24 Hrs  T(C): 36.6 (05-05-22 @ 06:42), Max: 36.6 (05-05-22 @ 06:42)  T(F): 97.9 (05-05-22 @ 06:42), Max: 97.9 (05-05-22 @ 06:42)  HR: --  BP: --  BP(mean): --  RR: --  SpO2: --    Orthostatic VS  05-05-22 @ 06:42  Lying BP: --/-- HR: --  Sitting BP: 125/70 HR: 90  Standing BP: 107/63 HR: 102  Site: --  Mode: --  Orthostatic VS  05-04-22 @ 08:30  Lying BP: --/-- HR: --  Sitting BP: 118/67 HR: 88  Standing BP: 108/72 HR: 108  Site: --  Mode: --  Orthostatic VS  05-03-22 @ 15:52  Lying BP: --/-- HR: --  Sitting BP: 135/85 HR: 113  Standing BP: 115/71 HR: 102  Site: --  Mode: --

## 2022-05-05 NOTE — BH INPATIENT PSYCHIATRY PROGRESS NOTE - MSE UNSTRUCTURED FT
On exam today the patient is generally cooperative.    Speech is clear and of normal rate.    Thought process: linear and goal directed.    Thought content: with no evidence of false beliefs or obsessions.   Perception: Denies hearing voices or other perceptual disturbances   Mood: Describes as "still depressed"  Affect: constricted in the depressed range.    Patient denies active suicidal ideation, intention and plan.    Patient denies active aggressive/homicidal ideation, intent or plan.   Patient is Alert and oriented .   Fund of knowledge is fair. Memory is intact  Insight and judgment are fair.   Impulse control is intact at this time.    Vital signs are stable.

## 2022-05-06 PROCEDURE — 99232 SBSQ HOSP IP/OBS MODERATE 35: CPT

## 2022-05-06 RX ORDER — SERTRALINE 25 MG/1
100 TABLET, FILM COATED ORAL DAILY
Refills: 0 | Status: DISCONTINUED | OUTPATIENT
Start: 2022-05-06 | End: 2022-05-09

## 2022-05-06 RX ADMIN — Medication 1 TABLET(S): at 08:57

## 2022-05-06 RX ADMIN — SERTRALINE 75 MILLIGRAM(S): 25 TABLET, FILM COATED ORAL at 08:57

## 2022-05-06 NOTE — BH INPATIENT PSYCHIATRY DISCHARGE NOTE - DESCRIPTION
Patient lives at home with parents. She left college after 1 semester after not attending class (along with peers) and not earning a high enough GPA. Then went to [employer] and worked there, got promoted, for 3 years. Quit in December and has been unemployed since which has been challenging. Has trouble finding motivation to find work. Has a girlfriend in [place] and is thinking about spending summer with gf's family there. Pt's parents are not accepting of pt's girlfriend, who is pt's main source of support. Patient states she has difficulty in her relationships with both parents and does not seek emotional support from them.

## 2022-05-06 NOTE — BH INPATIENT PSYCHIATRY DISCHARGE NOTE - NSDCCPCAREPLAN_GEN_ALL_CORE_FT
PRINCIPAL DISCHARGE DIAGNOSIS  Diagnosis: MDD (major depressive disorder), recurrent severe, without psychosis  Assessment and Plan of Treatment:

## 2022-05-06 NOTE — BH INPATIENT PSYCHIATRY PROGRESS NOTE - NSBHCHARTREVIEWVS_PSY_A_CORE FT
Vital Signs Last 24 Hrs  T(C): 36.7 (05-06-22 @ 08:01), Max: 36.8 (05-05-22 @ 17:57)  T(F): 98.1 (05-06-22 @ 08:01), Max: 98.2 (05-05-22 @ 17:57)  HR: --  BP: --  BP(mean): --  RR: --  SpO2: --    Orthostatic VS  05-06-22 @ 08:01  Lying BP: --/-- HR: --  Sitting BP: 110/69 HR: 85  Standing BP: 98/60 HR: 89  Site: --  Mode: --  Orthostatic VS  05-05-22 @ 06:42  Lying BP: --/-- HR: --  Sitting BP: 125/70 HR: 90  Standing BP: 107/63 HR: 102  Site: --  Mode: --

## 2022-05-06 NOTE — BH INPATIENT PSYCHIATRY DISCHARGE NOTE - HOSPITAL COURSE
Patient admitted from 5/3/22 through 5/9/22    Patient was depressed with hopelessness and helplessness on admission but quickly responded to support and an increase in her sertraline dose form 50 mg to 100 mg  She participated in groups and did well with new coping skills     On discharge exam today the patient is generally cooperative and makes fair eye contact.   Speech is clear and of normal rate.  Thought process: with no disorder of thought process.   Thought content: with no evidence of delusional beliefs.   Perception: Denies hallucinations.  Mood: Describes as "improved"   Affect: flat.  Patient denies suicidal and aggressive ideation, intent and plan.   AAO X3. Cognitively grossly intact.   Insight and judgment are improved.  Impulse control is intact at this time    Suicide and risk assessment performed prior to discharge.   The patient has a low acute risk and low chronic risk of self-harm and aggression towards others.     Protective factors include denying SI, no SIB, denying HI, good social supports in their family, no substance abuse, no current mood symptoms, no hopelessness, future-oriented in returning to home, no access to firearms.      Risk factors include presenting illness. Immediate risk was minimized by inpatient admission to a safe environment with appropriate supervision and limited access to lethal means.     Future risk was minimized before discharge by treatment of acute episode, maximizing outpatient support, providing relevant patient education, discussing emergency procedures, and ensuring close follow-up.     The patient remains at a low risk of self-harm, and such risk cannot be further ameliorated by continued inpatient treatment and the patient is therefore appropriate for discharge.       There were no behavioral problems on the unit.  Patient did not become agitated and did not require emergent intramuscular medications or seclusion / restraints.  Patient did not self-harm on the unit.      Patient remained actively engaged in treatment.  Patient participated in individual, group, and milieu therapy.  Patient got along appropriately with staff and peers.     Patient did not have any medical problems during this hospitalization.  There were no medical consultations.    A full discussion of the factors that predict treatment success and relapse was held including safety planning.  A discussion of the risks and benefits of patient’s medication was held including a discussion of the risks of sertraline was done     On day of discharge, the patient no longer requires inpatient treatment and care. Patient denies all suicidal and aggressive ideation, intent and plan. Patient denies anxiety symptoms and panic attacks. Patient is not judged to be an acute danger to self or others at this time. Patient will be discharged to home and outpatient follow up at [facility]   Patient admitted from 5/3/22 through 5/9/22    Patient was depressed with hopelessness and helplessness on admission but quickly responded to support and an increase in her sertraline dose form 50 mg to 100 mg.  She reported feeling that she had acted impulsively and worked on developing better coping skills   She participated in groups and did well with new coping skills     On discharge exam today the patient is generally cooperative and makes fair eye contact.   Speech is clear and of normal rate.  Thought process: with no disorder of thought process.   Thought content: with no evidence of delusional beliefs.   Perception: Denies hallucinations.  Mood: Describes as "improved"   Affect: flat.  Patient denies suicidal and aggressive ideation, intent and plan.   AAO X3. Cognitively grossly intact.   Insight and judgment are improved.  Impulse control is intact at this time    Suicide and risk assessment performed prior to discharge.   The patient has a low acute risk and low chronic risk of self-harm and aggression towards others.     Protective factors include denying SI, no SIB, denying HI, good social supports in their family, no substance abuse, no current mood symptoms, no hopelessness, future-oriented in returning to home, no access to firearms.      Risk factors include presenting illness. Immediate risk was minimized by inpatient admission to a safe environment with appropriate supervision and limited access to lethal means.     Future risk was minimized before discharge by treatment of acute episode, maximizing outpatient support, providing relevant patient education, discussing emergency procedures, and ensuring close follow-up.     The patient remains at a low risk of self-harm, and such risk cannot be further ameliorated by continued inpatient treatment and the patient is therefore appropriate for discharge.       There were no behavioral problems on the unit.  Patient did not become agitated and did not require emergent intramuscular medications or seclusion / restraints.  Patient did not self-harm on the unit.      Patient remained actively engaged in treatment.  Patient participated in individual, group, and milieu therapy.  Patient got along appropriately with staff and peers.     Patient did not have any medical problems during this hospitalization.  There were no medical consultations.    A full discussion of the factors that predict treatment success and relapse was held including safety planning.  A discussion of the risks and benefits of patient’s medication was held including a discussion of the risks of sertraline was done     On day of discharge, the patient no longer requires inpatient treatment and care. Patient denies all suicidal and aggressive ideation, intent and plan. Patient denies anxiety symptoms and panic attacks. Patient is not judged to be an acute danger to self or others at this time. Patient will be discharged to home and outpatient follow up at [facility]

## 2022-05-06 NOTE — BH INPATIENT PSYCHIATRY DISCHARGE NOTE - NSBHMETABOLIC_PSY_ALL_CORE_FT
BMI: BMI (kg/m2): 29.9 (05-03-22 @ 15:52)  HbA1c:   Glucose:   BP: --  Lipid Panel: Date/Time: 05-04-22 @ 09:53  Cholesterol, Serum: 164  Direct LDL: --  HDL Cholesterol, Serum: 33  Total Cholesterol/HDL Ration Measurement: --  Triglycerides, Serum: 119

## 2022-05-06 NOTE — BH INPATIENT PSYCHIATRY PROGRESS NOTE - MSE UNSTRUCTURED FT
On exam today the patient is cooperative.    Speech is clear and of normal rate.    Thought process: linear and goal directed.    Thought content: with no evidence of false beliefs or obsessions.   Perception: Denies hearing voices or other perceptual disturbances   Mood: Describes as "less depressed"  Affect: constricted .    Patient denies active suicidal ideation, intention and plan.    Patient denies active aggressive/homicidal ideation, intent or plan.   Patient is Alert and oriented .   Fund of knowledge is fair. Memory is intact  Insight and judgment are fair.   Impulse control is intact at this time.    Vital signs are stable.

## 2022-05-06 NOTE — BH INPATIENT PSYCHIATRY DISCHARGE NOTE - HPI (INCLUDE ILLNESS QUALITY, SEVERITY, DURATION, TIMING, CONTEXT, MODIFYING FACTORS, ASSOCIATED SIGNS AND SYMPTOMS)
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 Will), on Zoloft 50 mg, no past psychiatric admissions, suicide attempts or non-suicidal self injury, no history of violence, aggression or legal issues, reports regular use of cannabis and otherwise denies substance use, denies abuse/trauma and reports past medical history of allergic sinusitis and prior tonsillectomy (2/2019) who presents to the ED BIB parents after patient disclosed having ingested alcohol along with Oxycodone-Acetaminophen liquid and smoking cannabis with intent to hurt herself amidst feelings of wanting to die. In the ED, she conveyed feelings of depression for weeks in addition to thoughts of suicide. Patient was transferred to [facility] on 5/3.    On interview, patient is calm, cooperative, intermittently tearful. States she has been struggling with unemployment, with living at home, and with seeing her friends graduate college (patient left college after 1 semester). Patient states she was on [employer] the night before the suicide attempt and saw photos of people graduating. States this made her feel like a "loser." Patient woke up still feeling upset. Her girlfriend was in class and patient did not reach out for support. Patient states she had already seen the bottle of liquid percocet (rxed to her for her tonsil operation) which her mom had hidden in the mom's closet. Patient had noted it in case she would want to hurt herself in the future. On the day of the suicide attempt, patient waited until her father awoke so she could enter the room and get the bottle. She poured some of the medicine into a Angel's cup and got coke from downstairs to mix with it. She drank some but not all of this liquid and then had around a shot of rum from her room because she read not to mix the medication with alcohol. Patient states she thought it was not enough to kill herself, but thought that it would harm her. States that her intent was to harm herself, not kill herself. Patient states it was thinking of her parents and her girlfriend that stopped her from consuming more of the medicine. Patient states she stopped drinking the medicine when she realized she was getting tired, and texted her mother asking her to come up to the patient's room. She had not locked her door, said goodbyes, given away belongings, or imagined how she would be found. Her mother brought patient to the hospital. Reflecting now, patient states the attempt was "really stupid" and states she feels grateful to be alive. Patient denies symptoms of tl and psychosis.    Otherwise as per ED assessment:   "On assessment, patient relays "I was really depressed when I woke up then I drank medication with alcohol" sometime around 1:30pm. She describes her motives stating "I don't know if I wanted to die but I definitely wanted to hurt myself." She reports taking a couple ounces of some pain reliever from when she had her tonsils removed and drank some alcohol, approximately a shot of rum. She relays "then I got scared and I called my mom" elaborating that "I started not feeling good and I got nervous" and "I was really drowsy and nauseous." After telling her mother, her mother encouraged her to vomit which she did at home prior to leaving and again on ED arrival. She reports feeling better now but just has a bit of a headache and still feels a bit tired. She reports having struggled with depression for years and has recently been on Zoloft, after first trying a different medication, and the Zoloft seems to be working "I just have good and bad days." She notes having had more good days than bad recently "but then at night I can't stop thinking." She attributes this to stress from her job, previously working in retail as an , but notes she resigned a few days before Laura and has been looking for a different job. She tells me that in the interim, she found a psychiatrist, started the new medication, and has been trying to workout more in an effort to improve her mood and describes having felt she was actually doing better until this incident today occurred impulsively.    Patient notes that the Zoloft is actually a new medication in the last month after first having tried Wellbutrin with intolerable GI side effects. On ROS, she relays frequently feeling fatigued or tired and always wants to sleep. She describes little to no motivation and anhedonia but does still make efforts to work out as often as she can. She relays robust appetite which she attributes to the Zoloft and her upcoming menses. She relays significant struggles with anxiety but feels it has been better controlled in recent months noting that it was previously triggered mostly by her job. She conveys intermittent death wishes in addition to SI that was worse around the time she quit her job but admits she still intermittently has fleeting thoughts of death or suicide on her "bad days" such as today. She maintains being "not sure if I was trying to kill myself" with her actions today but instead admits she was trying to produce some sort of "damage" to herself. She is unable to convey what the purpose of producing damage would be if not death. She denies HI, AVH, paranoia, or symptoms of tl. She denies any past suicide attempts or non-suicidal self injury. She reports prior nicotine vape use, having quit use about a month ago and denies frequent drinking of alcohol. She reports regular use of cannabis, "probably twice a week" usually with friends and otherwise denies any other substance use. She denies any current thoughts of suicide or imminent intent to end her life stating "I think I learned my lesson" elaborating that "this is stupid.""

## 2022-05-06 NOTE — BH INPATIENT PSYCHIATRY DISCHARGE NOTE - NSDCMRMEDTOKEN_GEN_ALL_CORE_FT
sertraline 50 mg oral tablet: 1 tab(s) orally once a day   sertraline 100 mg oral tablet: 1 tab(s) orally once a day

## 2022-05-06 NOTE — BH INPATIENT PSYCHIATRY PROGRESS NOTE - NSBHFUPINTERVALHXFT_PSY_A_CORE
Patient seen for follow up of depression  Chart reviewed and case discussed with treatment team  Patient reports that she is less hopeless and denies SI  Tolerating sertraline dose at 75 mg

## 2022-05-07 PROCEDURE — 99232 SBSQ HOSP IP/OBS MODERATE 35: CPT

## 2022-05-07 RX ORDER — IBUPROFEN 200 MG
400 TABLET ORAL ONCE
Refills: 0 | Status: COMPLETED | OUTPATIENT
Start: 2022-05-07 | End: 2022-05-07

## 2022-05-07 RX ADMIN — SERTRALINE 100 MILLIGRAM(S): 25 TABLET, FILM COATED ORAL at 08:58

## 2022-05-07 RX ADMIN — Medication 400 MILLIGRAM(S): at 22:39

## 2022-05-07 RX ADMIN — Medication 400 MILLIGRAM(S): at 21:46

## 2022-05-07 RX ADMIN — Medication 400 MILLIGRAM(S): at 11:00

## 2022-05-07 NOTE — BH INPATIENT PSYCHIATRY PROGRESS NOTE - NSBHFUPINTERVALHXFT_PSY_A_CORE
Patient seen for follow up of depression  Chart reviewed and case discussed with treatment team  Patient reports that she is more hopeful about her future  Tolerating sertraline dose today at 100 mg

## 2022-05-07 NOTE — BH INPATIENT PSYCHIATRY PROGRESS NOTE - NSBHCHARTREVIEWVS_PSY_A_CORE FT
Vital Signs Last 24 Hrs  T(C): 36.8 (05-07-22 @ 17:16), Max: 36.8 (05-07-22 @ 17:16)  T(F): 98.3 (05-07-22 @ 17:16), Max: 98.3 (05-07-22 @ 17:16)  HR: 68 (05-07-22 @ 07:20) (68 - 68)  BP: 101/69 (05-07-22 @ 07:20) (101/69 - 101/69)  BP(mean): --  RR: --  SpO2: --    Orthostatic VS  05-06-22 @ 20:55  Lying BP: --/-- HR: --  Sitting BP: 127/87 HR: 104  Standing BP: 107/76 HR: 85  Site: --  Mode: --  Orthostatic VS  05-06-22 @ 08:01  Lying BP: --/-- HR: --  Sitting BP: 110/69 HR: 85  Standing BP: 98/60 HR: 89  Site: --  Mode: --

## 2022-05-07 NOTE — BH INPATIENT PSYCHIATRY PROGRESS NOTE - MSE UNSTRUCTURED FT
On exam today the patient is calm and cooperative.    Speech is clear and of normal rate.    Thought process: linear and goal directed.    Thought content: with no evidence of false beliefs or obsessions.   Perception: Denies hearing voices or other perceptual disturbances   Mood: Describes as "more hopeful"  Affect: constricted .    Patient denies active suicidal ideation, intention and plan.    Patient denies active aggressive/homicidal ideation, intent or plan.   Patient is Alert and oriented .   Fund of knowledge is fair. Memory is intact  Insight and judgment are fair.   Impulse control is intact at this time.    Vital signs are stable.

## 2022-05-08 PROCEDURE — 99231 SBSQ HOSP IP/OBS SF/LOW 25: CPT

## 2022-05-08 RX ORDER — ACETAMINOPHEN 500 MG
650 TABLET ORAL ONCE
Refills: 0 | Status: COMPLETED | OUTPATIENT
Start: 2022-05-08 | End: 2022-05-08

## 2022-05-08 RX ORDER — SERTRALINE 25 MG/1
1 TABLET, FILM COATED ORAL
Qty: 0 | Refills: 0 | DISCHARGE

## 2022-05-08 RX ORDER — SERTRALINE 25 MG/1
1 TABLET, FILM COATED ORAL
Qty: 14 | Refills: 0
Start: 2022-05-08 | End: 2022-05-21

## 2022-05-08 RX ADMIN — SERTRALINE 100 MILLIGRAM(S): 25 TABLET, FILM COATED ORAL at 08:55

## 2022-05-08 RX ADMIN — Medication 650 MILLIGRAM(S): at 23:45

## 2022-05-08 RX ADMIN — Medication 3 MILLIGRAM(S): at 22:17

## 2022-05-08 RX ADMIN — Medication 650 MILLIGRAM(S): at 23:21

## 2022-05-08 NOTE — BH INPATIENT PSYCHIATRY PROGRESS NOTE - MSE UNSTRUCTURED FT
On exam today the patient is calm and cooperative.    Speech is clear and of normal rate.    Thought process: linear and goal directed.    Thought content: with no evidence of false beliefs or obsessions.   Perception: Denies hearing voices or other perceptual disturbances   Mood: Describes as "better"  Affect: constricted .    Patient denies active suicidal ideation, intention and plan.    Patient denies active aggressive/homicidal ideation, intent or plan.   Patient is Alert and oriented .   Fund of knowledge is fair. Memory is intact  Insight and judgment are fair.   Impulse control is intact at this time.    Vital signs are stable.

## 2022-05-08 NOTE — BH INPATIENT PSYCHIATRY PROGRESS NOTE - NSBHFUPINTERVALHXFT_PSY_A_CORE
Patient seen for follow up of depression  Chart reviewed and case discussed with treatment team  Patient reports that she is more hopeful about her future  Excited about family visit  Tolerating sertraline at 100 mg

## 2022-05-08 NOTE — BH INPATIENT PSYCHIATRY PROGRESS NOTE - NSBHCHARTREVIEWVS_PSY_A_CORE FT
Vital Signs Last 24 Hrs  T(C): 36.7 (05-08-22 @ 07:51), Max: 36.8 (05-07-22 @ 17:16)  T(F): 98 (05-08-22 @ 07:51), Max: 98.3 (05-07-22 @ 17:16)  HR: 79 (05-08-22 @ 07:51) (79 - 79)  BP: 102/69 (05-08-22 @ 07:51) (102/69 - 102/69)  BP(mean): --  RR: --  SpO2: --    Orthostatic VS  05-06-22 @ 20:55  Lying BP: --/-- HR: --  Sitting BP: 127/87 HR: 104  Standing BP: 107/76 HR: 85  Site: --  Mode: --

## 2022-05-09 VITALS — TEMPERATURE: 97 F

## 2022-05-09 PROCEDURE — 99238 HOSP IP/OBS DSCHRG MGMT 30/<: CPT

## 2022-05-09 RX ADMIN — SERTRALINE 100 MILLIGRAM(S): 25 TABLET, FILM COATED ORAL at 08:19

## 2022-05-09 NOTE — BH INPATIENT PSYCHIATRY PROGRESS NOTE - NSBHINPTBILLING_PSY_ALL_CORE
48438 - Inpatient Low Complexity
87317 - Hospital Discharge Day Management; 30 min or less
41103 - Inpatient Moderate Complexity
54973 - Inpatient Moderate Complexity
16632 - Inpatient Moderate Complexity
75895 - Inpatient Moderate Complexity

## 2022-05-09 NOTE — BH INPATIENT PSYCHIATRY PROGRESS NOTE - NSICDXBHPRIMARYDX_PSY_ALL_CORE
MDD (major depressive disorder), recurrent severe, without psychosis   F33.2  

## 2022-05-09 NOTE — BH INPATIENT PSYCHIATRY PROGRESS NOTE - NSTXSLPPATGOAL_PSY_ALL_CORE
Will be able to identify and utilize 2 sleep hygiene strategies daily
Utilize relaxation techniques to aid in sleeping
Will be able to identify and utilize 2 sleep hygiene strategies daily

## 2022-05-09 NOTE — BH DISCHARGE NOTE NURSING/SOCIAL WORK/PSYCH REHAB - DISCHARGE INSTRUCTIONS AFTERCARE APPOINTMENTS
In order to check the location, date, or time of your aftercare appointment, please refer to your Discharge Instructions Document given to you upon leaving the hospital.  If you have lost the instructions please call [phone]

## 2022-05-09 NOTE — BH INPATIENT PSYCHIATRY PROGRESS NOTE - NSCGIIMPROVESX_PSY_ALL_CORE
2 = Much improved - notably better with signficant reduction of symptoms; increase in the level of functioning but some symptoms remain
3 = Minimally improved - slightly better with little or no clinically meaningful reduction of symptoms.  Represents very little change in basic clinical status, level of care, or functional capacity.

## 2022-05-09 NOTE — BH INPATIENT PSYCHIATRY PROGRESS NOTE - MSE UNSTRUCTURED FT
On exam today the patient is calm and cooperative.    Speech is clear and of normal rate.    Thought process: linear and goal directed.    Thought content: with no evidence of false beliefs or obsessions.   Perception: Denies hearing voices or other perceptual disturbances   Mood: Describes as "doing much better"  Affect: constricted .    Patient denies active suicidal ideation, intention and plan.    Patient denies active aggressive/homicidal ideation, intent or plan.   Patient is Alert and oriented .   Fund of knowledge is fair. Memory is intact  Insight and judgment are fair.   Impulse control is intact at this time.    Vital signs are stable.

## 2022-05-09 NOTE — BH INPATIENT PSYCHIATRY PROGRESS NOTE - NSDCCRITERIA_PSY_ALL_CORE
CGI less than or equal to 3

## 2022-05-09 NOTE — BH INPATIENT PSYCHIATRY PROGRESS NOTE - NSBHASSESSSUMMFT_PSY_ALL_CORE
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 Will), on Zoloft 50 mg, no past psychiatric admissions, suicide attempts or non-suicidal self injury, no history of violence, aggression or legal issues, reports regular use of cannabis and otherwise denies substance use, denies abuse/trauma and reports past medical history of allergic sinusitis and prior tonsillectomy (2/2019) who presents to the ED BIB parents after patient disclosed having ingested alcohol along with Oxycodone-Acetaminophen liquid and smoking cannabis with intent to hurt herself amidst feelings of wanting to die. In the ED, she conveyed feelings of depression for weeks in addition to thoughts of suicide. On interview, she describes increasing psychosocial stressors leading to feelings of worthlessness and hopelessness, which prompted suicide attempt. Attempt appears to have been premeditated, but with low perceived and objective lethality and high rescuability. Patient feels the attempt was a bad idea and is grateful to be alive. Given pt's history of passive SI while on Wellbutrin, low c/f for SSRI-related suicidality. Differential diagnosis includes MDD, adjustment disorder, and substance induced mood disorder.    Clinical update 5/4/22: Patient feels well, more motivated on unit to wake up and take care of self, denies thoughts of death or suicide. Open to med changes if indicated.     Plan:  C/w sertraline 50mg  Called pt's pharmacy for med rec. Patient taking Augmentin 875mg/125mg BID for sinusitis since 4/22 for total 2week course. Ordered through May 6.  PRN Ativan PO/IM for anxiety and agitation  Patient requires acute inpatient care for treatment of suicidality  Patient transferred from [facility] and admitted on a 9.13 voluntary status   Patient does not require constant observation at this time and will follow with routine checks.   Treatment will include individual therapy/supportive therapy/ rehab therapy/ psychopharmacological therapy and milieu therapy
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 Will), on Zoloft 50 mg, no past psychiatric admissions, suicide attempts or non-suicidal self injury, no history of violence, aggression or legal issues, reports regular use of cannabis and otherwise denies substance use, denies abuse/trauma and reports past medical history of allergic sinusitis and prior tonsillectomy (2/2019) who presents to the ED BIB parents after patient disclosed having ingested alcohol along with Oxycodone-Acetaminophen liquid and smoking cannabis with intent to hurt herself amidst feelings of wanting to die. In the ED, she conveyed feelings of depression for weeks in addition to thoughts of suicide. On interview, she describes increasing psychosocial stressors leading to feelings of worthlessness and hopelessness, which prompted suicide attempt. Attempt appears to have been premeditated, but with low perceived and objective lethality and high rescuability. Patient feels the attempt was a bad idea and is grateful to be alive. Given pt's history of passive SI while on Wellbutrin, low c/f for SSRI-related suicidality. Differential diagnosis includes MDD, adjustment disorder, and substance induced mood disorder.    Clinical update 5/7/22:   Patient reporting feeling less depressed and more hopeful.     Plan:  sertraline  100 mg daily  PRN Ativan PO/IM for anxiety and agitation  Patient requires acute inpatient care for treatment of suicidality  Patient transferred from [facility] ER and admitted on a 9.13 voluntary status   Patient does not require constant observation at this time and will follow with routine checks.   Treatment will include individual therapy/supportive therapy/ rehab therapy/ psychopharmacological therapy and milieu therapy
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 Will), on Zoloft 50 mg, no past psychiatric admissions, suicide attempts or non-suicidal self injury, no history of violence, aggression or legal issues, reports regular use of cannabis and otherwise denies substance use, denies abuse/trauma and reports past medical history of allergic sinusitis and prior tonsillectomy (2/2019) who presents to the ED BIB parents after patient disclosed having ingested alcohol along with Oxycodone-Acetaminophen liquid and smoking cannabis with intent to hurt herself amidst feelings of wanting to die. In the ED, she conveyed feelings of depression for weeks in addition to thoughts of suicide. On interview, she describes increasing psychosocial stressors leading to feelings of worthlessness and hopelessness, which prompted suicide attempt. Attempt appears to have been premeditated, but with low perceived and objective lethality and high rescuability. Patient feels the attempt was a bad idea and is grateful to be alive. Given pt's history of passive SI while on Wellbutrin, low c/f for SSRI-related suicidality. Differential diagnosis includes MDD, adjustment disorder, and substance induced mood disorder.    Clinical update 5/9/22:   Patient reporting feeling better and less depressed and more ready for discharge      Suicide and risk assessment performed prior to discharge.   The patient has a low acute risk and low chronic risk of self-harm and aggression towards others.     Protective factors include denying SI, no SIB, denying HI, good social supports in their family, no substance abuse, no current mood symptoms, no hopelessness, future-oriented in returning to home, no access to firearms.      Risk factors include presenting illness. Immediate risk was minimized by inpatient admission to a safe environment with appropriate supervision and limited access to lethal means.     Future risk was minimized before discharge by treatment of acute episode, maximizing outpatient support, providing relevant patient education, discussing emergency procedures, and ensuring close follow-up.     The patient remains at a low risk of self-harm, and such risk cannot be further ameliorated by continued inpatient treatment and the patient is therefore appropriate for discharge.       There were no behavioral problems on the unit.  Patient did not become agitated and did not require emergent intramuscular medications or seclusion / restraints.  Patient did not self-harm on the unit.      Patient remained actively engaged in treatment.  Patient participated in individual, group, and milieu therapy.  Patient got along appropriately with staff and peers.     Patient did not have any medical problems during this hospitalization.  There were no medical consultations.    A full discussion of the factors that predict treatment success and relapse was held including safety planning.  A discussion of the risks and benefits of patient’s medication was held including a discussion of the risks of sertraline was done     On day of discharge, the patient no longer requires inpatient treatment and care. Patient denies all suicidal and aggressive ideation, intent and plan. Patient denies anxiety symptoms and panic attacks. Patient is not judged to be an acute danger to self or others at this time. Patient will be discharged to home and outpatient follow up at [facility]  
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 Will), on Zoloft 50 mg, no past psychiatric admissions, suicide attempts or non-suicidal self injury, no history of violence, aggression or legal issues, reports regular use of cannabis and otherwise denies substance use, denies abuse/trauma and reports past medical history of allergic sinusitis and prior tonsillectomy (2/2019) who presents to the ED BIB parents after patient disclosed having ingested alcohol along with Oxycodone-Acetaminophen liquid and smoking cannabis with intent to hurt herself amidst feelings of wanting to die. In the ED, she conveyed feelings of depression for weeks in addition to thoughts of suicide. On interview, she describes increasing psychosocial stressors leading to feelings of worthlessness and hopelessness, which prompted suicide attempt. Attempt appears to have been premeditated, but with low perceived and objective lethality and high rescuability. Patient feels the attempt was a bad idea and is grateful to be alive. Given pt's history of passive SI while on Wellbutrin, low c/f for SSRI-related suicidality. Differential diagnosis includes MDD, adjustment disorder, and substance induced mood disorder.    Clinical update 5/6/22:   Patient reporting some improvement .     Plan:  Increase sertraline to 100 mg 5/7  PRN Ativan PO/IM for anxiety and agitation  Patient requires acute inpatient care for treatment of suicidality  Patient transferred from [facility] ER and admitted on a 9.13 voluntary status   Patient does not require constant observation at this time and will follow with routine checks.   Treatment will include individual therapy/supportive therapy/ rehab therapy/ psychopharmacological therapy and milieu therapy
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 Will), on Zoloft 50 mg, no past psychiatric admissions, suicide attempts or non-suicidal self injury, no history of violence, aggression or legal issues, reports regular use of cannabis and otherwise denies substance use, denies abuse/trauma and reports past medical history of allergic sinusitis and prior tonsillectomy (2/2019) who presents to the ED BIB parents after patient disclosed having ingested alcohol along with Oxycodone-Acetaminophen liquid and smoking cannabis with intent to hurt herself amidst feelings of wanting to die. In the ED, she conveyed feelings of depression for weeks in addition to thoughts of suicide. On interview, she describes increasing psychosocial stressors leading to feelings of worthlessness and hopelessness, which prompted suicide attempt. Attempt appears to have been premeditated, but with low perceived and objective lethality and high rescuability. Patient feels the attempt was a bad idea and is grateful to be alive. Given pt's history of passive SI while on Wellbutrin, low c/f for SSRI-related suicidality. Differential diagnosis includes MDD, adjustment disorder, and substance induced mood disorder.    Clinical update 5/8/22:   Patient reporting feeling better and less depressed and more hopeful.     Plan:  sertraline  100 mg daily  PRN Ativan PO/IM for anxiety and agitation  Patient requires acute inpatient care for treatment of suicidality  Patient transferred from [facility] ER and admitted on a 9.13 voluntary status   Patient does not require constant observation at this time and will follow with routine checks.   Treatment will include individual therapy/supportive therapy/ rehab therapy/ psychopharmacological therapy and milieu therapy
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 Will), on Zoloft 50 mg, no past psychiatric admissions, suicide attempts or non-suicidal self injury, no history of violence, aggression or legal issues, reports regular use of cannabis and otherwise denies substance use, denies abuse/trauma and reports past medical history of allergic sinusitis and prior tonsillectomy (2/2019) who presents to the ED BIB parents after patient disclosed having ingested alcohol along with Oxycodone-Acetaminophen liquid and smoking cannabis with intent to hurt herself amidst feelings of wanting to die. In the ED, she conveyed feelings of depression for weeks in addition to thoughts of suicide. On interview, she describes increasing psychosocial stressors leading to feelings of worthlessness and hopelessness, which prompted suicide attempt. Attempt appears to have been premeditated, but with low perceived and objective lethality and high rescuability. Patient feels the attempt was a bad idea and is grateful to be alive. Given pt's history of passive SI while on Wellbutrin, low c/f for SSRI-related suicidality. Differential diagnosis includes MDD, adjustment disorder, and substance induced mood disorder.    Clinical update 5/5/22:   Patient remains depressed but denies thoughts of death or suicide.     Plan:  Increase sertraline to 75 mg  Patient taking Augmentin 875mg/125mg BID for sinusitis since 4/22 for total 2week course. Ordered through May 6.  PRN Ativan PO/IM for anxiety and agitation  Patient requires acute inpatient care for treatment of suicidality  Patient transferred from [facility] ER and admitted on a 9.13 voluntary status   Patient does not require constant observation at this time and will follow with routine checks.   Treatment will include individual therapy/supportive therapy/ rehab therapy/ psychopharmacological therapy and milieu therapy

## 2022-05-09 NOTE — BH INPATIENT PSYCHIATRY PROGRESS NOTE - NSBHFUPINTERVALCCFT_PSY_A_CORE
"I am feeling more hopeful "
"I am feeling a little better"
"I am more hopeful and trying to do 3 new things every day "
"I am trying to do my best"
"I am feeling more productive here"
"I am feeling ready to go home"

## 2022-05-09 NOTE — BH INPATIENT PSYCHIATRY PROGRESS NOTE - NSTXDEPRESGOAL_PSY_ALL_CORE
Will identify 2 coping skills that assist in improving mood
Exhibit improvements in self-grooming, hygiene, sleep and appetite
Will identify 2 coping skills that assist in improving mood

## 2022-05-09 NOTE — BH DISCHARGE NOTE NURSING/SOCIAL WORK/PSYCH REHAB - NSDCPRGOAL_PSY_ALL_CORE
Writer met with pt for safety planning and discuss the pt’s progress throughout this hospitalization. Pt was able to complete safety plan with writer’s encouragement. Pt was able to identify warning signs, coping skills and people that she can reach out for support after discharge. Pt has made progress into her psych rehab goal of identifying coping skills for better sxs management. Pt was able to identify playing sports, talking to friends, walking dogs, and listening to music and driving around as her coping skills. Pt reported improvement in sxs in this hospitalization as her mood is improved. Pt is hopefulness towards future and believes that she is able to deal with her stressors with family and friend’s support and outpatient treatment. In this hospitalization, pt has demonstrated medication compliance. Pt denied SI/HI/AH/VH. Pt reported improvement in sleep and appetite. Pt was in good behavioral control. Pt was social and visible on the unit. Pt was observed interacting with peers. Pt was calm and cooperative with staff. Pt was able to verbalize her feelings, thoughts and reach out to staff for support. Pt was receptive to skill development. Pt attended 95% of psych rehab groups. Pt was verbal and engaged in group activities and discussions. Pt was able to process her feelings and thoughts with group support. Pt also utilized groups to explore and practice coping skills.

## 2022-05-09 NOTE — BH INPATIENT PSYCHIATRY PROGRESS NOTE - NSTXPROBSLPPAT_PSY_ALL_CORE
SLEEP PATTERN, DISTURBED

## 2022-05-09 NOTE — BH INPATIENT PSYCHIATRY PROGRESS NOTE - PRN MEDS
MEDICATIONS  (PRN):  LORazepam     Tablet 1 milliGRAM(s) Oral every 4 hours PRN anxiety  LORazepam   Injectable 2 milliGRAM(s) IntraMuscular once PRN severe agitation  melatonin. 3 milliGRAM(s) Oral at bedtime PRN Insomnia  

## 2022-05-09 NOTE — BH INPATIENT PSYCHIATRY PROGRESS NOTE - NSTXSUICIDGOAL_PSY_ALL_CORE
Be able to state 3 reasons for living
Will identify and utilize 2 coping skills
Be able to state 3 reasons for living

## 2022-05-09 NOTE — BH INPATIENT PSYCHIATRY PROGRESS NOTE - NSCGISEVERILLNESS_PSY_ALL_CORE
3 = Mildly ill – clearly established symptoms with minimal, if any, distress or difficulty in social and occupational function
5 = Markedly ill - intrusive symptoms that distinctly impair social/occupational function or cause intrusive levels of distress

## 2022-05-09 NOTE — BH INPATIENT PSYCHIATRY PROGRESS NOTE - NSBHFUPINTERVALHXFT_PSY_A_CORE
Patient seen for follow up of depression and discharge day management  Chart reviewed and case discussed with treatment team  Patient reports that she is more hopeful about her future  Excited about discharge and starting PHP  Tolerating sertraline at 100 mg

## 2022-05-09 NOTE — BH INPATIENT PSYCHIATRY PROGRESS NOTE - NSBHCONTPROVIDER_PSY_ALL_CORE
No, attempted...

## 2022-05-09 NOTE — BH DISCHARGE NOTE NURSING/SOCIAL WORK/PSYCH REHAB - NSCDUDCCRISIS_PSY_A_CORE
[facility]  1 (274) [facility]-WELL ([phone])  Text "WELL" to 69825  Website: www.[email or web address]/.[employer] 1 (817) 111-UYCT (9399) Website: www.safehorizon.org/.National Suicide Prevention Lifeline 9 (590) 802-7154/.  [employer]  1 (224) LIFENET (928-5088)/.  [facility]  [phone] [place] [phone]   Hours:  Monday through Friday from 9 AM to 3 PM/.  [employer] - [facility] Crisis Line  [phone], Option 1

## 2022-05-09 NOTE — BH INPATIENT PSYCHIATRY PROGRESS NOTE - NSBHCHARTREVIEWVS_PSY_A_CORE FT
Vital Signs Last 24 Hrs  T(C): 36.3 (05-09-22 @ 08:34), Max: 36.3 (05-09-22 @ 08:34)  T(F): 97.3 (05-09-22 @ 08:34), Max: 97.3 (05-09-22 @ 08:34)  HR: --  BP: --  BP(mean): --  RR: --  SpO2: --

## 2022-05-09 NOTE — BH DISCHARGE NOTE NURSING/SOCIAL WORK/PSYCH REHAB - PATIENT PORTAL LINK FT
You can access the FollowMyHealth Patient Portal offered by [facility] by registering at the following website: [email or web address]. By joining [facility]’s FollowMyHealth portal, you will also be able to view your health information using other applications (apps) compatible with our system.

## 2022-05-09 NOTE — BH DISCHARGE NOTE NURSING/SOCIAL WORK/PSYCH REHAB - NSBHDCADDR1FT_A_CORE
Your treatment in the partial hospital program is remote. Your will be contacted prior to your intake appointment with either a Zoom link or password.  [facility] runs weekdays from 9AM to 3 PM with breaks in between sessions and for luc.  [facility] program lasts up to 6 weeks. Please be on-time for the below virtual intake appointment.

## 2022-05-09 NOTE — BH INPATIENT PSYCHIATRY PROGRESS NOTE - CURRENT MEDICATION
MEDICATIONS  (STANDING):  amoxicillin  875 milliGRAM(s)/clavulanate 1 Tablet(s) Oral two times a day  sertraline 50 milliGRAM(s) Oral daily    MEDICATIONS  (PRN):  LORazepam     Tablet 1 milliGRAM(s) Oral every 4 hours PRN anxiety  LORazepam   Injectable 2 milliGRAM(s) IntraMuscular once PRN severe agitation  melatonin. 3 milliGRAM(s) Oral at bedtime PRN Insomnia  
MEDICATIONS  (STANDING):  sertraline 75 milliGRAM(s) Oral daily    MEDICATIONS  (PRN):  LORazepam     Tablet 1 milliGRAM(s) Oral every 4 hours PRN anxiety  LORazepam   Injectable 2 milliGRAM(s) IntraMuscular once PRN severe agitation  melatonin. 3 milliGRAM(s) Oral at bedtime PRN Insomnia  
MEDICATIONS  (STANDING):  amoxicillin  875 milliGRAM(s)/clavulanate 1 Tablet(s) Oral two times a day  sertraline 75 milliGRAM(s) Oral daily    MEDICATIONS  (PRN):  LORazepam     Tablet 1 milliGRAM(s) Oral every 4 hours PRN anxiety  LORazepam   Injectable 2 milliGRAM(s) IntraMuscular once PRN severe agitation  melatonin. 3 milliGRAM(s) Oral at bedtime PRN Insomnia  
MEDICATIONS  (STANDING):  sertraline 100 milliGRAM(s) Oral daily    MEDICATIONS  (PRN):  LORazepam     Tablet 1 milliGRAM(s) Oral every 4 hours PRN anxiety  LORazepam   Injectable 2 milliGRAM(s) IntraMuscular once PRN severe agitation  melatonin. 3 milliGRAM(s) Oral at bedtime PRN Insomnia  

## 2022-05-09 NOTE — BH INPATIENT PSYCHIATRY PROGRESS NOTE - NSBHCONSBHPROVDETAILS_PSY_A_CORE  FT
DEVORA with Dr. Will

## 2022-05-09 NOTE — BH INPATIENT PSYCHIATRY PROGRESS NOTE - NSBHMETABOLIC_PSY_ALL_CORE_FT
BMI: BMI (kg/m2): 29.9 (05-03-22 @ 15:52)  HbA1c:   Glucose:   BP: 102/69 (05-08-22 @ 07:51) (101/69 - 102/69)  Lipid Panel: Date/Time: 05-04-22 @ 09:53  Cholesterol, Serum: 164  Direct LDL: --  HDL Cholesterol, Serum: 33  Total Cholesterol/HDL Ration Measurement: --  Triglycerides, Serum: 119  
BMI: BMI (kg/m2): 29.9 (05-03-22 @ 15:52)  HbA1c:   Glucose:   BP: 102/69 (05-08-22 @ 07:51) (101/69 - 102/69)  Lipid Panel: Date/Time: 05-04-22 @ 09:53  Cholesterol, Serum: 164  Direct LDL: --  HDL Cholesterol, Serum: 33  Total Cholesterol/HDL Ration Measurement: --  Triglycerides, Serum: 119  
BMI: BMI (kg/m2): 29.9 (05-03-22 @ 15:52)  HbA1c:   Glucose:   BP: --  Lipid Panel: Date/Time: 05-04-22 @ 09:53  Cholesterol, Serum: 164  Direct LDL: --  HDL Cholesterol, Serum: 33  Total Cholesterol/HDL Ration Measurement: --  Triglycerides, Serum: 119  
BMI: BMI (kg/m2): 29.9 (05-03-22 @ 15:52)  HbA1c:   Glucose:   BP: --  Lipid Panel: Date/Time: 05-04-22 @ 09:53  Cholesterol, Serum: 164  Direct LDL: --  HDL Cholesterol, Serum: 33  Total Cholesterol/HDL Ration Measurement: --  Triglycerides, Serum: 119  
BMI: BMI (kg/m2): 29.9 (05-03-22 @ 15:52)  HbA1c:   Glucose:   BP: 101/69 (05-07-22 @ 07:20) (101/69 - 101/69)  Lipid Panel: Date/Time: 05-04-22 @ 09:53  Cholesterol, Serum: 164  Direct LDL: --  HDL Cholesterol, Serum: 33  Total Cholesterol/HDL Ration Measurement: --  Triglycerides, Serum: 119  
BMI: BMI (kg/m2): 29.9 (05-03-22 @ 15:52)  HbA1c:   Glucose:   BP: --  Lipid Panel: Date/Time: 05-04-22 @ 09:53  Cholesterol, Serum: 164  Direct LDL: --  HDL Cholesterol, Serum: 33  Total Cholesterol/HDL Ration Measurement: --  Triglycerides, Serum: 119

## 2022-05-09 NOTE — BH INPATIENT PSYCHIATRY PROGRESS NOTE - NSTXSUICIDINTERMD_PSY_ALL_CORE
Med management and psychotherapy

## 2022-05-10 ENCOUNTER — OUTPATIENT (OUTPATIENT)
Dept: OUTPATIENT SERVICES | Facility: HOSPITAL | Age: 22
LOS: 1 days | Discharge: HOME | End: 2022-05-10

## 2022-05-10 DIAGNOSIS — Z90.89 ACQUIRED ABSENCE OF OTHER ORGANS: Chronic | ICD-10-CM

## 2022-05-10 PROCEDURE — 90792 PSYCH DIAG EVAL W/MED SRVCS: CPT

## 2022-05-17 PROCEDURE — 99214 OFFICE O/P EST MOD 30 MIN: CPT

## 2022-05-24 PROCEDURE — 99213 OFFICE O/P EST LOW 20 MIN: CPT

## 2022-05-31 PROCEDURE — 99213 OFFICE O/P EST LOW 20 MIN: CPT

## 2022-06-06 PROCEDURE — 99212 OFFICE O/P EST SF 10 MIN: CPT

## 2022-06-08 DIAGNOSIS — F12.10 CANNABIS ABUSE, UNCOMPLICATED: ICD-10-CM

## 2022-06-15 ENCOUNTER — OUTPATIENT (OUTPATIENT)
Dept: OUTPATIENT SERVICES | Facility: HOSPITAL | Age: 22
LOS: 1 days | Discharge: HOME | End: 2022-06-15
Payer: COMMERCIAL

## 2022-06-15 DIAGNOSIS — Z90.89 ACQUIRED ABSENCE OF OTHER ORGANS: Chronic | ICD-10-CM

## 2022-06-15 PROCEDURE — 90791 PSYCH DIAGNOSTIC EVALUATION: CPT | Mod: 95

## 2022-07-26 DIAGNOSIS — F33.9 MAJOR DEPRESSIVE DISORDER, RECURRENT, UNSPECIFIED: ICD-10-CM

## 2023-05-01 ENCOUNTER — OUTPATIENT (OUTPATIENT)
Dept: OUTPATIENT SERVICES | Facility: HOSPITAL | Age: 23
LOS: 1 days | Discharge: STILL ADMITTED OR ADMINISTRATIVE | End: 2023-05-01
Payer: COMMERCIAL

## 2023-05-01 DIAGNOSIS — Z90.89 ACQUIRED ABSENCE OF OTHER ORGANS: Chronic | ICD-10-CM

## 2023-05-01 PROCEDURE — 99214 OFFICE O/P EST MOD 30 MIN: CPT | Mod: 95

## 2023-05-02 DIAGNOSIS — F12.10 CANNABIS ABUSE, UNCOMPLICATED: ICD-10-CM

## 2023-05-02 DIAGNOSIS — F33.9 MAJOR DEPRESSIVE DISORDER, RECURRENT, UNSPECIFIED: ICD-10-CM

## 2023-05-10 ENCOUNTER — OUTPATIENT (OUTPATIENT)
Dept: OUTPATIENT SERVICES | Facility: HOSPITAL | Age: 23
LOS: 1 days | Discharge: PSYCHIATRIC OR SUBSTANCE USE FACILITY | End: 2023-05-10
Payer: COMMERCIAL

## 2023-05-10 DIAGNOSIS — Z90.89 ACQUIRED ABSENCE OF OTHER ORGANS: Chronic | ICD-10-CM

## 2023-05-10 PROCEDURE — 90791 PSYCH DIAGNOSTIC EVALUATION: CPT

## 2023-05-19 PROCEDURE — 90832 PSYTX W PT 30 MINUTES: CPT | Mod: 95

## 2023-05-24 DIAGNOSIS — F33.9 MAJOR DEPRESSIVE DISORDER, RECURRENT, UNSPECIFIED: ICD-10-CM

## 2023-05-25 PROCEDURE — 99214 OFFICE O/P EST MOD 30 MIN: CPT | Mod: 95

## 2023-06-16 PROCEDURE — 90832 PSYTX W PT 30 MINUTES: CPT | Mod: 95

## 2023-06-23 PROCEDURE — 90832 PSYTX W PT 30 MINUTES: CPT | Mod: 95

## 2023-06-30 PROCEDURE — 90832 PSYTX W PT 30 MINUTES: CPT | Mod: 95

## 2023-07-21 PROCEDURE — 90834 PSYTX W PT 45 MINUTES: CPT | Mod: 95

## 2023-07-28 PROCEDURE — 90832 PSYTX W PT 30 MINUTES: CPT | Mod: 95

## 2023-08-01 PROCEDURE — 99214 OFFICE O/P EST MOD 30 MIN: CPT | Mod: 95

## 2023-08-04 PROCEDURE — 90832 PSYTX W PT 30 MINUTES: CPT | Mod: 95

## 2023-08-14 PROCEDURE — 99214 OFFICE O/P EST MOD 30 MIN: CPT | Mod: 95

## 2023-08-16 NOTE — ED ADULT NURSE REASSESSMENT NOTE - NS ED NURSE REASSESS COMMENT FT1
Patient with telepsych at this time. Render Risk Assessment In Note?: no Detail Level: Simple Additional Notes: Patient has mild flare today \\n\\nCompared photos, improvement noted\\n\\nPatient will continue current regimen \\n\\nPatient  to continue gentle skincare

## 2023-09-01 PROCEDURE — 90832 PSYTX W PT 30 MINUTES: CPT

## 2023-09-07 PROCEDURE — 99214 OFFICE O/P EST MOD 30 MIN: CPT | Mod: 95

## 2023-09-08 PROCEDURE — 90832 PSYTX W PT 30 MINUTES: CPT

## 2023-09-21 PROCEDURE — 99214 OFFICE O/P EST MOD 30 MIN: CPT | Mod: 95

## 2023-09-22 PROCEDURE — 90832 PSYTX W PT 30 MINUTES: CPT

## 2023-10-05 PROCEDURE — 99214 OFFICE O/P EST MOD 30 MIN: CPT | Mod: 95

## 2023-10-06 PROCEDURE — 90832 PSYTX W PT 30 MINUTES: CPT

## 2023-10-13 PROCEDURE — 90832 PSYTX W PT 30 MINUTES: CPT

## 2023-10-25 PROCEDURE — 99214 OFFICE O/P EST MOD 30 MIN: CPT | Mod: 95

## 2023-11-08 PROCEDURE — 90832 PSYTX W PT 30 MINUTES: CPT

## 2023-11-09 PROCEDURE — 99214 OFFICE O/P EST MOD 30 MIN: CPT | Mod: 95

## 2023-11-20 PROCEDURE — 90832 PSYTX W PT 30 MINUTES: CPT

## 2023-11-30 PROCEDURE — 99214 OFFICE O/P EST MOD 30 MIN: CPT | Mod: 95

## 2023-12-14 PROCEDURE — 99214 OFFICE O/P EST MOD 30 MIN: CPT | Mod: 95

## 2023-12-20 NOTE — BH DISCHARGE NOTE NURSING/SOCIAL WORK/PSYCH REHAB - NSDCSUICIDEDEAD_PSY_ALL_CORE
Her last cholesterol 192, triglyceride 170, HDL 51, .  Advised for low-cholesterol low-carb diet.  Will follow lipid panel.   No

## 2023-12-28 PROCEDURE — 99214 OFFICE O/P EST MOD 30 MIN: CPT | Mod: 95

## 2024-01-12 PROCEDURE — 90832 PSYTX W PT 30 MINUTES: CPT | Mod: 95

## 2024-01-17 PROCEDURE — 99214 OFFICE O/P EST MOD 30 MIN: CPT

## 2024-01-19 PROCEDURE — 90832 PSYTX W PT 30 MINUTES: CPT | Mod: 95

## 2024-02-09 PROCEDURE — 90832 PSYTX W PT 30 MINUTES: CPT | Mod: 93

## 2024-02-15 PROCEDURE — 99214 OFFICE O/P EST MOD 30 MIN: CPT | Mod: 95

## 2024-02-16 PROCEDURE — 90832 PSYTX W PT 30 MINUTES: CPT | Mod: 95

## 2024-02-28 NOTE — ED ADULT TRIAGE NOTE - IDEAL BODY WEIGHT(KG)
Bleeding that does not stop/Swelling that gets worse/Pain not relieved by Medications/Fever greater than (need to indicate Fahrenheit or Celsius)/Wound/Surgical Site with redness, or foul smelling discharge or pus/Numbness, tingling, color or temperature change to extremity
57

## 2024-05-09 PROCEDURE — 90833 PSYTX W PT W E/M 30 MIN: CPT | Mod: 93

## 2024-05-30 PROCEDURE — 99214 OFFICE O/P EST MOD 30 MIN: CPT | Mod: 95

## 2024-11-14 PROCEDURE — 99214 OFFICE O/P EST MOD 30 MIN: CPT | Mod: 95

## 2024-12-05 PROCEDURE — 99214 OFFICE O/P EST MOD 30 MIN: CPT | Mod: 95

## 2025-02-10 PROCEDURE — 99214 OFFICE O/P EST MOD 30 MIN: CPT | Mod: 95
